# Patient Record
Sex: MALE | Race: WHITE | NOT HISPANIC OR LATINO | Employment: FULL TIME | URBAN - METROPOLITAN AREA
[De-identification: names, ages, dates, MRNs, and addresses within clinical notes are randomized per-mention and may not be internally consistent; named-entity substitution may affect disease eponyms.]

---

## 2019-03-06 ENCOUNTER — OFFICE VISIT (OUTPATIENT)
Dept: FAMILY MEDICINE | Facility: CLINIC | Age: 54
End: 2019-03-06
Payer: COMMERCIAL

## 2019-03-06 ENCOUNTER — TELEPHONE (OUTPATIENT)
Dept: FAMILY MEDICINE | Facility: CLINIC | Age: 54
End: 2019-03-06

## 2019-03-06 ENCOUNTER — HOSPITAL ENCOUNTER (OUTPATIENT)
Dept: RADIOLOGY | Facility: HOSPITAL | Age: 54
Discharge: HOME | End: 2019-03-06
Attending: INTERNAL MEDICINE
Payer: COMMERCIAL

## 2019-03-06 VITALS
TEMPERATURE: 99 F | WEIGHT: 181.69 LBS | SYSTOLIC BLOOD PRESSURE: 132 MMHG | OXYGEN SATURATION: 99 % | HEART RATE: 80 BPM | BODY MASS INDEX: 27.54 KG/M2 | HEIGHT: 68 IN | DIASTOLIC BLOOD PRESSURE: 80 MMHG

## 2019-03-06 DIAGNOSIS — M25.532 ACUTE PAIN OF LEFT WRIST: Primary | ICD-10-CM

## 2019-03-06 DIAGNOSIS — M25.532 ACUTE PAIN OF LEFT WRIST: ICD-10-CM

## 2019-03-06 PROCEDURE — 99203 OFFICE O/P NEW LOW 30 MIN: CPT | Mod: S$GLB,,, | Performed by: INTERNAL MEDICINE

## 2019-03-06 PROCEDURE — 73110 X-RAY EXAM OF WRIST: CPT | Mod: 26,LT,, | Performed by: RADIOLOGY

## 2019-03-06 PROCEDURE — 73110 X-RAY EXAM OF WRIST: CPT | Mod: TC,FY,PO,LT

## 2019-03-06 PROCEDURE — 73110 XR WRIST COMPLETE 3 VIEWS LEFT: ICD-10-PCS | Mod: 26,LT,, | Performed by: RADIOLOGY

## 2019-03-06 PROCEDURE — 99203 PR OFFICE/OUTPT VISIT, NEW, LEVL III, 30-44 MIN: ICD-10-PCS | Mod: S$GLB,,, | Performed by: INTERNAL MEDICINE

## 2019-03-06 PROCEDURE — 99999 PR PBB SHADOW E&M-EST. PATIENT-LVL III: ICD-10-PCS | Mod: PBBFAC,,, | Performed by: INTERNAL MEDICINE

## 2019-03-06 PROCEDURE — 99999 PR PBB SHADOW E&M-EST. PATIENT-LVL III: CPT | Mod: PBBFAC,,, | Performed by: INTERNAL MEDICINE

## 2019-03-06 PROCEDURE — 3008F PR BODY MASS INDEX (BMI) DOCUMENTED: ICD-10-PCS | Mod: CPTII,S$GLB,, | Performed by: INTERNAL MEDICINE

## 2019-03-06 PROCEDURE — 3008F BODY MASS INDEX DOCD: CPT | Mod: CPTII,S$GLB,, | Performed by: INTERNAL MEDICINE

## 2019-03-06 RX ORDER — NAPROXEN 500 MG/1
500 TABLET ORAL 2 TIMES DAILY
Qty: 28 TABLET | Refills: 0 | Status: SHIPPED | OUTPATIENT
Start: 2019-03-06 | End: 2019-03-20

## 2019-03-06 NOTE — PATIENT INSTRUCTIONS
Wrist Splint: Velcro  A splint is designed to prevent movement of the bones, muscles and tendons. Velcro wrist splints are used because of their comfort and convenience for wrist and hand injuries. In certain conditions, the splint can be removed when bathing or changing clothes. The condition you are being treated for will determine how long you should wear the splint and if it is safe to remove your splint before your next visit. If you are unsure, ask your nurse or doctor.  When to seek medical advice  Call your healthcare provider right away if any of these occur:  · Increased pain or swelling under the splint or in the hand or fingers  · Fingers or hand becomes cold, blue, numb or tingly  Date Last Reviewed: 11/21/2015 © 2000-2017 [employer]. [address] [place] [address], [place] [address]. All rights reserved. This information is not intended as a substitute for professional medical care. Always follow your healthcare professional's instructions.

## 2019-03-06 NOTE — TELEPHONE ENCOUNTER
Spoke with patient and informd him that per Dr. Ozuna his xray is negative, recommendation is to continued use of wrist brace, ice, and pain medication. Until symptoms resolves. Patient verbalized understanding.

## 2019-03-06 NOTE — LETTER
March 6, 2019      [facility]  [address] [facility]  [place] [address]  Phone: [phone]  Fax: [phone]       Patient: Jose M Pascual   YOB: 1965  Date of Visit: 03/06/2019    To Whom It May Concern:    Narinder Pascual  was at [facility] on 03/06/2019. He is excused from work until 3/12. If you have any questions or concerns, or if I can be of further assistance, please do not hesitate to contact me.    Sincerely,      Daryl Ozuna MD

## 2019-03-06 NOTE — TELEPHONE ENCOUNTER
Called patient to inform him of negative x-ray results - recommend continued use of wrist brace, ice, and pain medication/anti-inflammatory until symptoms resolving. LMOM to c/b regarding

## 2019-03-06 NOTE — TELEPHONE ENCOUNTER
----- Message from Gloria Hernandez sent at 3/6/2019  2:45 PM CST -----  Contact: alyse   Name of Who is Calling: alyse       What is the request in detail: Patient was returning a missed call back from the staff      Can the clinic reply by [facility]: no      What Number [facility] Call Back [facility]: [phone]

## 2019-03-06 NOTE — PROGRESS NOTES
Subjective:       Chief Complaint  Chief Complaint   Patient presents with    Fall    Wrist Pain       HPI  Jose M Pascual is a 53 y.o. male with multiple medical diagnoses as listed in the medical history and problem list that presents for fall/wrist pain.     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       PAST SURGICAL HISTORY:  Past Surgical History:   Procedure Laterality Date    KNEE ARTHROSCOPY Right 2010    KNEE SURGERY         SOCIAL HISTORY:  Social History     Socioeconomic History    Marital status:      Spouse name: Not on file    Number of children: Not on file    Years of education: Not on file    Highest education level: Not on file   Social Needs    Financial resource strain: Not on file    Food insecurity - worry: Not on file    Food insecurity - inability: Not on file    Transportation needs - medical: Not on file    Transportation needs - non-medical: Not on file   Occupational History    Not on file   Tobacco Use    Smoking status: Current Every Day Smoker     Types: Cigarettes    Smokeless tobacco: Current User     Types: Snuff    Tobacco comment: pt states not even a pack a day   Substance and Sexual Activity    Alcohol use: Yes     Alcohol/week: 0.0 oz     Frequency: Monthly or less     Drinks per session: 1 or 2    Drug use: No    Sexual activity: Not on file   Other Topics Concern    Not on file   Social History Narrative    Not on file       FAMILY HISTORY:  Family History   Problem Relation Age of Onset    Cancer Father         throat/       ALLERGIES AND MEDICATIONS: updated and reviewed.  Review of patient's allergies  "indicates:   Allergen Reactions    Mobic [meloxicam] Other (See Comments)     Pt is not sure of what reaction was, it has been awhile    Tramadol Other (See Comments)     Pt states it made him feel like he was having a heart attack     Current Outpatient Medications   Medication Sig Dispense Refill    fluticasone (FLONASE) 50 mcg/actuation nasal spray 1 spray by Each Nare route 2 (two) times daily as needed. 15 g 0    loratadine (CLARITIN) 10 mg tablet Take 1 tablet (10 mg total) by mouth once daily. 60 tablet 0    naproxen (NAPROSYN) 500 MG tablet Take 1 tablet (500 mg total) by mouth 2 (two) times daily. for 14 days 28 tablet 0     No current facility-administered medications for this visit.          ROS  Review of Systems   Constitutional: Negative.    Musculoskeletal: Positive for arthralgias (left wrist pain). Negative for gait problem.         Objective:       Physical Exam  Vitals:    03/06/19 1333   BP: 132/80   BP Location: Right arm   Patient Position: Sitting   BP Method: Medium (Manual)   Pulse: 80   Temp: 98.6 °F (37 °C)   TempSrc: Oral   SpO2: 99%   Weight: 82.4 kg (181 lb 10.5 oz)   Height: 5' 8" (1.727 m)    Body mass index is 27.62 kg/m².  Weight: 82.4 kg (181 lb 10.5 oz)   Height: 5' 8" (172.7 cm)   Physical Exam   Constitutional: He appears well-developed and well-nourished. No distress.   HENT:   Head: Normocephalic and atraumatic.   Eyes: Conjunctivae and EOM are normal. Pupils are equal, round, and reactive to light.   Cardiovascular: Normal rate.   Musculoskeletal: He exhibits edema and tenderness (left wrist/carpal bones). He exhibits no deformity.   Limited ROM of left wrist with flexion/extension   Neurological: He is alert. He displays normal reflexes. No cranial nerve deficit or sensory deficit. He exhibits normal muscle tone.   Skin: Skin is warm and dry. No rash noted.   Psychiatric: He has a normal mood and affect. His behavior is normal.           Assessment:     1. Acute pain of " left wrist      Plan:     Jose M was seen today for fall and wrist pain.    Diagnoses and all orders for this visit:    Acute pain of left wrist  Consider fracture vs tendinopathy in the setting of acute injury  Start NSAID and obtain plain films  Discussed use of wrist brace/immobilization  Consider Ortho referral pending imaging  -     naproxen (NAPROSYN) 500 MG tablet; Take 1 tablet (500 mg total) by mouth 2 (two) times daily. for 14 days  -     X-Ray Wrist Complete Left; Future          Health Maintenance       Date Due Completion Date    Hepatitis C Screening 1965 ---    TETANUS VACCINE 03/24/1982 ---    Colonoscopy 03/24/2014 ---    Lipid Panel 06/09/2015 6/9/2010    Influenza Vaccine 04/24/2019 (Originally 8/1/2018) ---    Pneumococcal Vaccine (Medium Risk) (1 of 1 - PPSV23) 03/06/2020 (Originally 3/24/1983) ---            Health Maintenance reviewed - discussed establishing care potentially with new PCP and addressing at subsequent visit    Follow-up if symptoms worsen or fail to improve.    The patient expressed understanding and no barriers to adherence were identified.     1. The patient indicates understanding of these issues and agrees with the plan. Brief care plan is updated and reviewed with the patient as applicable.     2. The patient is given an After Visit Summary that lists all medications with directions, allergies, orders placed during this encounter and follow-up instructions.     3. I have reviewed the patient's medical information including past medical, family, and social history sections including the medications and allergies.     4. We discussed the patient's current medications. I reconciled the patient's medication list and prepared and supplied needed refills.       Daryl Ozuna MD  Internal Medicine-Pediatrics

## 2019-03-12 ENCOUNTER — TELEPHONE (OUTPATIENT)
Dept: FAMILY MEDICINE | Facility: CLINIC | Age: 54
End: 2019-03-12

## 2019-03-12 DIAGNOSIS — M25.539 ACUTE WRIST PAIN, UNSPECIFIED LATERALITY: Primary | ICD-10-CM

## 2019-03-12 RX ORDER — DICLOFENAC SODIUM 50 MG/1
50 TABLET, DELAYED RELEASE ORAL 2 TIMES DAILY PRN
Qty: 30 TABLET | Refills: 0 | Status: SHIPPED | OUTPATIENT
Start: 2019-03-12

## 2019-03-12 NOTE — TELEPHONE ENCOUNTER
Spoke with patient and he states that the naproxen is causing him to have an upset stomach and it is not helping him with his pain. Patient would like something else prescribe for him. Please advise

## 2019-03-12 NOTE — TELEPHONE ENCOUNTER
Sent VOLTAREN to be taken twice daily to patient's preferred pharmacy on file. Continue usage of brace - if not improving with refer to Orthopedic Surgery

## 2019-03-12 NOTE — TELEPHONE ENCOUNTER
----- Message from Miriamtari Deepak sent at 3/12/2019 11:57 AM CDT -----  Contact: ANTONIO LEARY [[id number]]  Name of Who is Calling:ANTONIO LEARY [[id number]]        What is the request in detail: Pt requesting alternate medication for pain. States naproxen (NAPROSYN) 500 MG tablet helps with swelling but not pain. Contact at earliest convenience.  Thanks-          Can the clinic reply by [facility]: N    What Number to Call Back if not in [facility]: [phone]

## 2019-03-13 NOTE — TELEPHONE ENCOUNTER
Spoke with patient and informed him that a new prescription has been sent to the pharmacy. Patient verbalized understanding.

## 2019-03-14 ENCOUNTER — TELEPHONE (OUTPATIENT)
Dept: FAMILY MEDICINE | Facility: CLINIC | Age: 54
End: 2019-03-14

## 2019-03-14 NOTE — TELEPHONE ENCOUNTER
Would only use anti-inflammatory therapy otherwise recommend Orthopedic Surgery evaluation for further treatment discussion -

## 2019-03-14 NOTE — TELEPHONE ENCOUNTER
----- Message from Adelaide Vaz sent at 3/14/2019 10:15 AM CDT -----  Contact: Jose M [phone]  The patient is calling to notify the staff that the pharmacy has never received the new Rx that was sent over from Dr. Ozuna. He would like it resent to: [id number] - [place], [place] - [address]

## 2019-03-14 NOTE — TELEPHONE ENCOUNTER
Patient notified of information below and verbalized understanding.  He states that he will have to discuss seeing orthopedics with his wife before he agrees to go to for the consultation.

## 2020-06-25 ENCOUNTER — HOSPITAL ENCOUNTER (EMERGENCY)
Facility: HOSPITAL | Age: 55
Discharge: HOME | End: 2020-06-25
Attending: EMERGENCY MEDICINE
Payer: COMMERCIAL

## 2020-06-25 VITALS
RESPIRATION RATE: 20 BRPM | DIASTOLIC BLOOD PRESSURE: 72 MMHG | WEIGHT: 187 LBS | SYSTOLIC BLOOD PRESSURE: 165 MMHG | BODY MASS INDEX: 29.35 KG/M2 | HEART RATE: 56 BPM | OXYGEN SATURATION: 97 % | HEIGHT: 67 IN | TEMPERATURE: 98 F

## 2020-06-25 DIAGNOSIS — M51.36 BULGE OF LUMBAR DISC WITHOUT MYELOPATHY: ICD-10-CM

## 2020-06-25 DIAGNOSIS — R20.2 PARESTHESIAS: Primary | ICD-10-CM

## 2020-06-25 DIAGNOSIS — M54.16 LUMBAR RADICULOPATHY: ICD-10-CM

## 2020-06-25 LAB
ALBUMIN SERPL BCP-MCNC: 4.5 G/DL (ref 3.5–5.2)
ALP SERPL-CCNC: 87 U/L (ref 55–135)
ALT SERPL W/O P-5'-P-CCNC: 51 U/L (ref 10–44)
ANION GAP SERPL CALC-SCNC: 6 MMOL/L (ref 8–16)
AST SERPL-CCNC: 33 U/L (ref 10–40)
BASOPHILS # BLD AUTO: 0.05 K/UL (ref 0–0.2)
BASOPHILS NFR BLD: 0.7 % (ref 0–1.9)
BILIRUB SERPL-MCNC: 0.4 MG/DL (ref 0.1–1)
BUN SERPL-MCNC: 9 MG/DL (ref 6–20)
CALCIUM SERPL-MCNC: 9.4 MG/DL (ref 8.7–10.5)
CHLORIDE SERPL-SCNC: 106 MMOL/L (ref 95–110)
CO2 SERPL-SCNC: 30 MMOL/L (ref 23–29)
CREAT SERPL-MCNC: 1.2 MG/DL (ref 0.5–1.4)
DIFFERENTIAL METHOD: ABNORMAL
EOSINOPHIL # BLD AUTO: 0.3 K/UL (ref 0–0.5)
EOSINOPHIL NFR BLD: 3.7 % (ref 0–8)
ERYTHROCYTE [DISTWIDTH] IN BLOOD BY AUTOMATED COUNT: 14 % (ref 11.5–14.5)
EST. GFR  (AFRICAN AMERICAN): >60 ML/MIN/1.73 M^2
EST. GFR  (NON AFRICAN AMERICAN): >60 ML/MIN/1.73 M^2
GLUCOSE SERPL-MCNC: 80 MG/DL (ref 70–110)
HCT VFR BLD AUTO: 46.1 % (ref 40–54)
HGB BLD-MCNC: 15.3 G/DL (ref 14–18)
IMM GRANULOCYTES # BLD AUTO: 0.02 K/UL (ref 0–0.04)
IMM GRANULOCYTES NFR BLD AUTO: 0.3 % (ref 0–0.5)
LYMPHOCYTES # BLD AUTO: 2.3 K/UL (ref 1–4.8)
LYMPHOCYTES NFR BLD: 30.6 % (ref 18–48)
MCH RBC QN AUTO: 33 PG (ref 27–31)
MCHC RBC AUTO-ENTMCNC: 33.2 G/DL (ref 32–36)
MCV RBC AUTO: 100 FL (ref 82–98)
MONOCYTES # BLD AUTO: 0.6 K/UL (ref 0.3–1)
MONOCYTES NFR BLD: 7.6 % (ref 4–15)
NEUTROPHILS # BLD AUTO: 4.4 K/UL (ref 1.8–7.7)
NEUTROPHILS NFR BLD: 57.1 % (ref 38–73)
NRBC BLD-RTO: 0 /100 WBC
PLATELET # BLD AUTO: 258 K/UL (ref 150–350)
PMV BLD AUTO: 10.1 FL (ref 9.2–12.9)
POTASSIUM SERPL-SCNC: 4.4 MMOL/L (ref 3.5–5.1)
PROT SERPL-MCNC: 7.3 G/DL (ref 6–8.4)
RBC # BLD AUTO: 4.63 M/UL (ref 4.6–6.2)
SODIUM SERPL-SCNC: 142 MMOL/L (ref 136–145)
WBC # BLD AUTO: 7.64 K/UL (ref 3.9–12.7)

## 2020-06-25 PROCEDURE — 63600175 PHARM REV CODE 636 W HCPCS: Performed by: EMERGENCY MEDICINE

## 2020-06-25 PROCEDURE — 85025 COMPLETE CBC W/AUTO DIFF WBC: CPT

## 2020-06-25 PROCEDURE — 96372 THER/PROPH/DIAG INJ SC/IM: CPT

## 2020-06-25 PROCEDURE — 80053 COMPREHEN METABOLIC PANEL: CPT

## 2020-06-25 PROCEDURE — 99284 EMERGENCY DEPT VISIT MOD MDM: CPT | Mod: 25

## 2020-06-25 RX ORDER — METHYLPREDNISOLONE 4 MG/1
TABLET ORAL
Qty: 1 PACKAGE | Refills: 0 | Status: SHIPPED | OUTPATIENT
Start: 2020-06-25 | End: 2020-07-16

## 2020-06-25 RX ORDER — DIAZEPAM 5 MG/1
5 TABLET ORAL EVERY 8 HOURS PRN
Qty: 15 TABLET | Refills: 0 | Status: SHIPPED | OUTPATIENT
Start: 2020-06-25 | End: 2023-03-27

## 2020-06-25 RX ORDER — PREDNISONE 20 MG/1
20 TABLET ORAL
Status: COMPLETED | OUTPATIENT
Start: 2020-06-25 | End: 2020-06-25

## 2020-06-25 RX ORDER — KETOROLAC TROMETHAMINE 30 MG/ML
30 INJECTION, SOLUTION INTRAMUSCULAR; INTRAVENOUS
Status: COMPLETED | OUTPATIENT
Start: 2020-06-25 | End: 2020-06-25

## 2020-06-25 RX ADMIN — PREDNISONE 20 MG: 20 TABLET ORAL at 06:06

## 2020-06-25 RX ADMIN — KETOROLAC TROMETHAMINE 30 MG: 30 INJECTION, SOLUTION INTRAMUSCULAR at 06:06

## 2020-06-25 NOTE — ED NOTES
Pt reports intermittent numbness/tingling to damian legs and groin since Friday. Sates episodes last 5-10 minutes. Reports hx back pain and when he sits a certain way, the numbness comes. Lower back pain 4/10.

## 2020-06-26 ENCOUNTER — TELEPHONE (OUTPATIENT)
Dept: NEUROLOGY | Facility: CLINIC | Age: 55
End: 2020-06-26

## 2020-06-26 NOTE — ED PROVIDER NOTES
"Encounter Date: 6/25/2020       History     Chief Complaint   Patient presents with    Numbness     Patient reports intermittent numbness to BLE "for years". Reports recent numbness to genital area intermittently while sitting for too long. Denies weakness, blurred vision, weakness.      Mr Pascual reports years of intermittent tingling and pain in his L leg and in more recent years, also in the right leg. Reports pain begins in low back on either side and radiates into buttocks and down side of leg. Reports over past 6 days he's had new and more worrisome symptoms come and go. Reports pins and needles down both buttocks and sides of legs that wouldn't have made him come to er but it also involved his penis 3 episodes over past 4 days. Reports onset when sitting at rest, reports legs will 'go numb' and will feel numbness to penis with pain to tip of penis. No scrotal involvement. No weakness but a lot of pain and paresthesias. States he has to stand up and move position and symptoms get a little better but come back when sitting down. Had one episode of a lot of back pain 3d ago that resolved after 'a while'. No recent trauma. No other new issues. Has had tingling and intermittent pain in L arm for years, no worsening.  No change in bowel or bladder function.  No new difficulty initiating urination stream.  No problems with erection    The history is provided by the patient.     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/2012     Past Surgical History:   Procedure Laterality Date    KNEE ARTHROSCOPY Right 2010    KNEE SURGERY       Family History   Problem Relation Age of Onset    Cancer Father        "  throat/     Social History     Tobacco Use    Smoking status: Current Every Day Smoker     Types: Cigarettes    Smokeless tobacco: Current User     Types: Snuff    Tobacco comment: pt states not even a pack a day   Substance Use Topics    Alcohol use: Yes     Alcohol/week: 0.0 standard drinks     Frequency: Monthly or less     Drinks per session: 1 or 2    Drug use: No     Review of Systems   Gastrointestinal: Negative.  Negative for diarrhea and rectal pain.   Genitourinary: Negative for difficulty urinating, discharge, dysuria, enuresis and flank pain.   Musculoskeletal: Positive for back pain. Negative for gait problem.   Neurological:        Paresthesia as in HPI   All other systems reviewed and are negative.      Physical Exam     Initial Vitals [06/25/20 1219]   BP Pulse Resp Temp SpO2   (!) 145/86 73 18 98.1 °F (36.7 °C) 98 %      MAP       --         Physical Exam    Nursing note and vitals reviewed.  Constitutional: He appears well-developed and well-nourished. He is not diaphoretic. No distress.   Sitting up. Calm. Nad. Polite.    HENT:   Head: Normocephalic and atraumatic.   Eyes: Conjunctivae and EOM are normal.   Neck: Normal range of motion. Neck supple.   Pulmonary/Chest: Breath sounds normal. No respiratory distress. He has no wheezes. He has no rales.   Abdominal: Soft. He exhibits no distension. There is no abdominal tenderness.   Musculoskeletal: Normal range of motion. No tenderness or edema.   Neurological: He is alert and oriented to person, place, and time. He has normal strength. GCS score is 15. GCS eye subscore is 4. GCS verbal subscore is 5. GCS motor subscore is 6.   No clonus. 5/5 strength in all ext. 2+ L patellar reflex. 1+ on R. Normal gait. Neg romberg. Positive straight leg raise with pain in back at 40deg   Skin: Skin is warm. Capillary refill takes less than 2 seconds.   Psychiatric: He has a normal mood and affect. Thought content normal.         ED Course    Procedures  Labs Reviewed   CBC W/ AUTO DIFFERENTIAL - Abnormal; Notable for the following components:       Result Value    Mean Corpuscular Volume 100 (*)     Mean Corpuscular Hemoglobin 33.0 (*)     All other components within normal limits   COMPREHENSIVE METABOLIC PANEL - Abnormal; Notable for the following components:    CO2 30 (*)     ALT 51 (*)     Anion Gap 6 (*)     All other components within normal limits          Imaging Results          MRI Thoracic Spine Without Contrast (Final result)  Result time 06/25/20 18:18:52    Final result by Dony Wheeler MD (06/25/20 18:18:52)                 Impression:      Unremarkable MRI of the thoracic spine.      Electronically signed by: Dony Wheeler MD  Date:    06/25/2020  Time:    18:18             Narrative:    EXAMINATION:  MRI THORACIC SPINE WITHOUT CONTRAST    CLINICAL HISTORY:  T-spine canal stenosis;    TECHNIQUE:  Multiplanar, multisequence images were performed through the thoracic spine.  Contrast was not administered.    COMPARISON:  Lumbar spine MRI dated 06/25/2020.    FINDINGS:  The thoracic alignment is within normal limits.  The vertebral body heights are maintained.  The bone marrow signal is within normal limits.    The cord is normal in signal and caliber.  There are no intraspinal collections.  There is no evidence of mass effect on the cord.    The intervertebral disc spaces are maintained.  There is mild hypertrophy of the posterior elements.  Evaluation of the individual disc levels reveals no evidence of spinal canal or neural foraminal narrowing.    The paraspinal soft tissues are within normal limits.  There is a T2 hyperintense linear artifact at the cervicothoracic junction.                               MRI Lumbar Spine Without Contrast (Final result)  Result time 06/25/20 15:51:54    Final result by Kai Clark MD (06/25/20 15:51:54)                 Impression:      Modest lumbar spondylosis without significant central canal  stenosis or neural foraminal impingement.      Electronically signed by: Kai Clark  Date:    06/25/2020  Time:    15:51             Narrative:    EXAMINATION:  MRI LUMBAR SPINE WITHOUT CONTRAST    CLINICAL HISTORY:  Back pain, cauda equina syndrome suspected;    TECHNIQUE:  Multiplanar, multisequence MR images were acquired from the thoracolumbar junction to the sacrum without the administration of contrast.    COMPARISON:  CT abdomen pelvis with contrast dated 12/01/2016    FINDINGS:  Lumbar spine vertebral body heights and alignment appear maintained.  No infiltrative marrow process.  Spinal cord terminus lies at L1-L2.  Visualized prevertebral and paraspinal soft tissues demonstrate no significant abnormality.    T12-L1: No significant posterior disc herniation, spinal canal stenosis, or neural foraminal impingement.    L1-L2: No significant posterior disc herniation, spinal canal stenosis, or neural foraminal impingement.    L2-L3: Mild circumferential bulge without significant central canal stenosis.  Mild right neural foraminal narrowing.    L3-L4: Minimal bulge without significant central canal stenosis or neural foraminal impingement.    L4-L5: Minimal bulge without significant central canal stenosis or neural foraminal impingement.    L5-S1: No significant posterior disc herniation, spinal canal stenosis, or neural foraminal impingement.                                 Medical Decision Making:   Clinical Tests:   Lab Tests: Ordered and Reviewed  Radiological Study: Ordered and Reviewed  ED Management:  Mr Pascual has been stable during his time in the ER.  He has been sitting in a chair.  He reports he is hungry and wants to go home.  He does report some sciatica, lumbar radiculopathy type pain to the left side, not on right.  He remains with a normal steady gait.  MRI lumbar spine obtained.  Discussed clinical picture with neurosurgery, who recommended MRI T-spine.  MRI T-spine performed and both are  acutely unremarkable.  Discussed with neurosurgery.  Patient can follow up in clinic soon.  Discussed radiology results with patient.  Will place on Medrol Dosepak, provide muscle relaxants.  Discussed home care and return precautions.  He voiced good understanding.  Will follow up with your surgery.  Is stable for discharge.  There is no indication for further emergent intervention or evaluation this time.                                 Clinical Impression:       ICD-10-CM ICD-9-CM   1. Paresthesias  R20.2 782.0   2. Lumbar radiculopathy  M54.16 724.4   3. Bulge of lumbar disc without myelopathy  M51.26 722.10             ED Disposition Condition    Discharge Stable        ED Prescriptions     Medication Sig Dispense Start Date End Date Auth. Provider    methylPREDNISolone (MEDROL DOSEPACK) 4 mg tablet Take dosepak as directed 1 Package 6/25/2020 7/16/2020 Cynthia Grider MD    diazePAM (VALIUM) 5 MG tablet Take 1 tablet (5 mg total) by mouth every 8 (eight) hours as needed (muscle spasms). 15 tablet 6/25/2020 7/25/2020 Cynthia Grider MD        Follow-up Information     Follow up With Specialties Details Why Contact Info    Princess Blanchard MD Neurosurgery Schedule an appointment as soon as possible for a visit   [address] [facility] [address]  [phone]                                       Cynthia Grider MD  06/29/20 3220

## 2021-04-15 ENCOUNTER — PATIENT MESSAGE (OUTPATIENT)
Dept: RESEARCH | Facility: HOSPITAL | Age: 56
End: 2021-04-15

## 2023-03-27 ENCOUNTER — OFFICE VISIT (OUTPATIENT)
Dept: CARDIOLOGY | Facility: CLINIC | Age: 58
End: 2023-03-27
Payer: COMMERCIAL

## 2023-03-27 VITALS
HEIGHT: 67 IN | DIASTOLIC BLOOD PRESSURE: 76 MMHG | SYSTOLIC BLOOD PRESSURE: 132 MMHG | OXYGEN SATURATION: 97 % | WEIGHT: 179 LBS | HEART RATE: 76 BPM | BODY MASS INDEX: 28.09 KG/M2 | RESPIRATION RATE: 18 BRPM

## 2023-03-27 DIAGNOSIS — R07.9 CHEST PAIN, UNSPECIFIED TYPE: ICD-10-CM

## 2023-03-27 DIAGNOSIS — I10 HYPERTENSION, UNSPECIFIED TYPE: Primary | ICD-10-CM

## 2023-03-27 PROCEDURE — 3008F BODY MASS INDEX DOCD: CPT | Mod: CPTII,S$GLB,, | Performed by: INTERNAL MEDICINE

## 2023-03-27 PROCEDURE — 99999 PR PBB SHADOW E&M-EST. PATIENT-LVL IV: ICD-10-PCS | Mod: PBBFAC,,, | Performed by: INTERNAL MEDICINE

## 2023-03-27 PROCEDURE — 1160F RVW MEDS BY RX/DR IN RCRD: CPT | Mod: CPTII,S$GLB,, | Performed by: INTERNAL MEDICINE

## 2023-03-27 PROCEDURE — 93000 EKG 12-LEAD: ICD-10-PCS | Mod: S$GLB,,, | Performed by: INTERNAL MEDICINE

## 2023-03-27 PROCEDURE — 1159F MED LIST DOCD IN RCRD: CPT | Mod: CPTII,S$GLB,, | Performed by: INTERNAL MEDICINE

## 2023-03-27 PROCEDURE — 1159F PR MEDICATION LIST DOCUMENTED IN MEDICAL RECORD: ICD-10-PCS | Mod: CPTII,S$GLB,, | Performed by: INTERNAL MEDICINE

## 2023-03-27 PROCEDURE — 3078F DIAST BP <80 MM HG: CPT | Mod: CPTII,S$GLB,, | Performed by: INTERNAL MEDICINE

## 2023-03-27 PROCEDURE — 99204 PR OFFICE/OUTPT VISIT, NEW, LEVL IV, 45-59 MIN: ICD-10-PCS | Mod: S$GLB,,, | Performed by: INTERNAL MEDICINE

## 2023-03-27 PROCEDURE — 3075F SYST BP GE 130 - 139MM HG: CPT | Mod: CPTII,S$GLB,, | Performed by: INTERNAL MEDICINE

## 2023-03-27 PROCEDURE — 1160F PR REVIEW ALL MEDS BY PRESCRIBER/CLIN PHARMACIST DOCUMENTED: ICD-10-PCS | Mod: CPTII,S$GLB,, | Performed by: INTERNAL MEDICINE

## 2023-03-27 PROCEDURE — 3078F PR MOST RECENT DIASTOLIC BLOOD PRESSURE < 80 MM HG: ICD-10-PCS | Mod: CPTII,S$GLB,, | Performed by: INTERNAL MEDICINE

## 2023-03-27 PROCEDURE — 3075F PR MOST RECENT SYSTOLIC BLOOD PRESS GE 130-139MM HG: ICD-10-PCS | Mod: CPTII,S$GLB,, | Performed by: INTERNAL MEDICINE

## 2023-03-27 PROCEDURE — 93000 ELECTROCARDIOGRAM COMPLETE: CPT | Mod: S$GLB,,, | Performed by: INTERNAL MEDICINE

## 2023-03-27 PROCEDURE — 3008F PR BODY MASS INDEX (BMI) DOCUMENTED: ICD-10-PCS | Mod: CPTII,S$GLB,, | Performed by: INTERNAL MEDICINE

## 2023-03-27 PROCEDURE — 99204 OFFICE O/P NEW MOD 45 MIN: CPT | Mod: S$GLB,,, | Performed by: INTERNAL MEDICINE

## 2023-03-27 PROCEDURE — 99999 PR PBB SHADOW E&M-EST. PATIENT-LVL IV: CPT | Mod: PBBFAC,,, | Performed by: INTERNAL MEDICINE

## 2023-03-27 NOTE — PROGRESS NOTES
CARDIOVASCULAR CONSULTATION    REASON FOR CONSULT:   Jose M Pascual is a 58 y.o. male who presents for ***.      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 If you have any questions, ask your nurse or doctor.                CONTINUE taking these medications      diazePAM 5 MG tablet  Commonly known as: VALIUM  Take 1 tablet (5 mg total) by mouth every 8 (eight) hours as needed (muscle spasms).     diclofenac 50 MG EC tablet  Commonly known as: VOLTAREN  Take 1 tablet (50 mg total) by mouth 2 (two) times daily as needed (wrist pain).     fluticasone propionate 50 mcg/actuation nasal spray  Commonly known as: FLONASE  1 spray by Each Nare route 2 (two) times daily as needed.     loratadine 10 mg tablet  Commonly known as: CLARITIN  Take 1 tablet (10 mg total) by mouth once daily.              SOCIAL HISTORY:     Social History     Socioeconomic History    Marital status:    Tobacco Use    Smoking status: Every Day     Types: Cigarettes    Smokeless tobacco: Current     Types: Snuff    Tobacco comments:     pt states not even a pack a day   Substance and Sexual Activity    Alcohol use: Yes     Alcohol/week: 0.0 standard drinks    Drug use: No       FAMILY HISTORY:     Family History   Problem Relation Age of Onset    Cancer Father         throat/       REVIEW OF SYSTEMS:   ROS    A 10 point review  "of systems was performed and all the pertinent positives have been mentioned. Rest of review of systems was negative.        PHYSICAL EXAM:     Vitals:    03/27/23 1046   BP: 132/76   Pulse: 76   Resp: 18    Body mass index is 28.04 kg/m².  Weight: 81.2 kg (179 lb 0.2 oz)   Height: 5' 7" (170.2 cm)     Physical Exam  Cardiovascular:      Heart sounds: Murmur heard.         DATA:     Laboratory:  CBC:  Recent Labs   Lab 06/25/20  1359   WBC 7.64   Hemoglobin 15.3   Hematocrit 46.1   Platelets 258       CHEMISTRIES:  Recent Labs   Lab 06/25/20  1359   Glucose 80   Sodium 142   Potassium 4.4   BUN 9   Creatinine 1.2   eGFR if  >60   eGFR if non African American >60   Calcium 9.4       CARDIAC BIOMARKERS:        COAGS:        LIPIDS/LFTS:  Recent Labs   Lab 06/25/20  1359   AST 33   ALT 51 H       Hemoglobin A1C   Date Value Ref Range Status   03/02/2011 5.6 4.0 - 6.2 % Final       TSH        The ASCVD Risk score (Adalberto DK, et al., 2019) failed to calculate for the following reasons:    Cannot find a previous HDL lab    Cannot find a previous total cholesterol lab             ASSESSMENT AND PLAN     Patient Active Problem List   Diagnosis    Knee pain    Traumatic osteoarthritis of knee or lower leg    Iliotibial band syndrome                 Thank you very much for involving me in the care of your patient.  Please do not hesitate to contact me if there are any questions.      Milton Soni MD, FACC, [facility]  Interventional Cardiologist, [facility].           This note was dictated with the help of speech recognition software.  There might be un-intended errors and/or substitutions.                "

## 2023-03-27 NOTE — PROGRESS NOTES
CARDIOVASCULAR CONSULTATION    REASON FOR CONSULT:   Jose M Pascual is a 58 y.o. male who presents for evaluation of chest pain    HISTORY OF PRESENT ILLNESS:       Patient is a pleasant 58-year-old man.  Here for evaluation of chest pain.  No particular aggravating or relieving factors.  Denies any orthopnea, PND.  Smokes about half a pack a day.      PAST MEDICAL HISTORY:     Past Medical History:   Diagnosis Date    Traumatic osteoarthritis of knee or lower leg 8/1/2012       PAST SURGICAL HISTORY:     Past Surgical History:   Procedure Laterality Date    KNEE ARTHROSCOPY Right 2010    KNEE SURGERY         ALLERGIES AND MEDICATION:     Review of patient's allergies indicates:   Allergen Reactions    Mobic [meloxicam] Other (See Comments)     Pt is not sure of what reaction was, it has been awhile    Tramadol Other (See Comments)     Pt states it made him feel like he was having a heart attack        Medication List            Accurate as of March 27, 2023 11:14 AM. If you have any questions, ask your nurse or doctor.                CONTINUE taking these medications      diazePAM 5 MG tablet  Commonly known as: VALIUM  Take 1 tablet (5 mg total) by mouth every 8 (eight) hours as needed (muscle spasms).     diclofenac 50 MG EC tablet  Commonly known as: VOLTAREN  Take 1 tablet (50 mg total) by mouth 2 (two) times daily as needed (wrist pain).     fluticasone propionate 50 mcg/actuation nasal spray  Commonly known as: FLONASE  1 spray by Each Nare route 2 (two) times daily as needed.     loratadine 10 mg tablet  Commonly known as: CLARITIN  Take 1 tablet (10 mg total) by mouth once daily.              SOCIAL HISTORY:     Social History     Socioeconomic History    Marital status:    Tobacco Use    Smoking status: Every Day     Types: Cigarettes    Smokeless tobacco: Current     Types: Snuff    Tobacco comments:     pt states not even a pack a day   Substance and Sexual Activity    Alcohol use: Yes      "Alcohol/week: 0.0 standard drinks    Drug use: No       FAMILY HISTORY:     Family History   Problem Relation Age of Onset    Cancer Father         throat/       REVIEW OF SYSTEMS:   Review of Systems   Constitutional: Negative.   HENT: Negative.     Eyes: Negative.    Cardiovascular:  Positive for chest pain.   Respiratory: Negative.     Endocrine: Negative.    Hematologic/Lymphatic: Negative.    Skin: Negative.    Musculoskeletal: Negative.    Gastrointestinal: Negative.    Genitourinary: Negative.    Neurological: Negative.    Psychiatric/Behavioral: Negative.     Allergic/Immunologic: Negative.      A 10 point review of systems was performed and all the pertinent positives have been mentioned. Rest of review of systems was negative.        PHYSICAL EXAM:     Vitals:    03/27/23 1046   BP: 132/76   Pulse: 76   Resp: 18    Body mass index is 28.04 kg/m².  Weight: 81.2 kg (179 lb 0.2 oz)   Height: 5' 7" (170.2 cm)     Physical Exam  Vitals reviewed.   Constitutional:       Appearance: He is well-developed.   HENT:      Head: Normocephalic.   Eyes:      Conjunctiva/sclera: Conjunctivae normal.      Pupils: Pupils are equal, round, and reactive to light.   Cardiovascular:      Rate and Rhythm: Normal rate and regular rhythm.      Heart sounds: Murmur heard.   Pulmonary:      Effort: Pulmonary effort is normal.      Breath sounds: Normal breath sounds.   Abdominal:      General: Bowel sounds are normal.      Palpations: Abdomen is soft.   Musculoskeletal:      Cervical back: Normal range of motion and neck supple.   Skin:     General: Skin is warm.   Neurological:      Mental Status: He is alert and oriented to person, place, and time.         DATA:     Laboratory:  CBC:  Recent Labs   Lab 06/25/20  1359   WBC 7.64   Hemoglobin 15.3   Hematocrit 46.1   Platelets 258       CHEMISTRIES:  Recent Labs   Lab 06/25/20  1359   Glucose 80   Sodium 142   Potassium 4.4   BUN 9   Creatinine 1.2   eGFR if African American >60 "   eGFR if non African American >60   Calcium 9.4       CARDIAC BIOMARKERS:        COAGS:        LIPIDS/LFTS:  Recent Labs   Lab 06/25/20  1359   AST 33   ALT 51 H       Hemoglobin A1C   Date Value Ref Range Status   03/02/2011 5.6 4.0 - 6.2 % Final       TSH        The ASCVD Risk score (Adalberto CERVANTES, et al., 2019) failed to calculate for the following reasons:    Cannot find a previous HDL lab    Cannot find a previous total cholesterol lab             ASSESSMENT AND PLAN     Patient Active Problem List   Diagnosis    Knee pain    Traumatic osteoarthritis of knee or lower leg    Iliotibial band syndrome         Patient with multiple risk factors for coronary artery disease.  Coming with chest pain.  States will not be able to run on a treadmill because of knee pain and back pain.  Further evaluation with the help of pharmacological nuclear stress testing and echocardiogram.  Follow-up after testing        Thank you very much for involving me in the care of your patient.  Please do not hesitate to contact me if there are any questions.      Milton Soni MD, FACC, [facility]  Interventional Cardiologist, [facility].           This note was dictated with the help of speech recognition software.  There might be un-intended errors and/or substitutions.

## 2023-04-12 ENCOUNTER — TELEPHONE (OUTPATIENT)
Dept: CARDIOLOGY | Facility: CLINIC | Age: 58
End: 2023-04-12
Payer: COMMERCIAL

## 2023-04-12 NOTE — TELEPHONE ENCOUNTER
I have attempted to contact this patient by phone with the following results: no answer.          ----- Message from Milton Soni MD sent at 4/10/2023  1:26 PM CDT -----  Which stress test would she prefer and why?      ----- Message -----  From: Kacy Paredes RN  Sent: 3/30/2023   5:04 PM CDT  To: Milton Soni MD      ----- Message -----  From: Elder Elliott  Sent: 3/30/2023  12:44 PM CDT  To: [facility] Staff    Type: Patient Call Back    Who called:pt     What is the request in detail:pt wife requesting a different stress test be done     Can the clinic reply by [facility]?call     Would the patient rather a call back or a response via [facility]?call back     Best call back number: [phone] - pt   [phone]- wife       Additional Information:

## 2023-10-05 ENCOUNTER — OFFICE VISIT (OUTPATIENT)
Dept: URGENT CARE | Facility: CLINIC | Age: 58
End: 2023-10-05
Payer: COMMERCIAL

## 2023-10-05 VITALS
TEMPERATURE: 98 F | DIASTOLIC BLOOD PRESSURE: 87 MMHG | HEART RATE: 62 BPM | HEIGHT: 67 IN | SYSTOLIC BLOOD PRESSURE: 147 MMHG | RESPIRATION RATE: 17 BRPM | BODY MASS INDEX: 28.09 KG/M2 | OXYGEN SATURATION: 96 % | WEIGHT: 179 LBS

## 2023-10-05 DIAGNOSIS — L03.221 CELLULITIS OF NECK: Primary | ICD-10-CM

## 2023-10-05 PROBLEM — M48.02 SPINAL STENOSIS IN CERVICAL REGION: Status: ACTIVE | Noted: 2021-04-22

## 2023-10-05 PROBLEM — M48.061 SPINAL STENOSIS OF LUMBAR REGION: Status: ACTIVE | Noted: 2020-08-13

## 2023-10-05 PROBLEM — M50.20 DISPLACEMENT OF CERVICAL INTERVERTEBRAL DISC WITHOUT MYELOPATHY: Status: ACTIVE | Noted: 2021-04-22

## 2023-10-05 PROBLEM — G89.4 CHRONIC PAIN SYNDROME: Status: ACTIVE | Noted: 2022-03-18

## 2023-10-05 PROCEDURE — 99203 OFFICE O/P NEW LOW 30 MIN: CPT | Mod: S$GLB,,,

## 2023-10-05 PROCEDURE — 99203 PR OFFICE/OUTPT VISIT, NEW, LEVL III, 30-44 MIN: ICD-10-PCS | Mod: S$GLB,,,

## 2023-10-05 RX ORDER — CELECOXIB 200 MG/1
200 CAPSULE ORAL
COMMUNITY
Start: 2023-09-13

## 2023-10-05 RX ORDER — HYDROCODONE BITARTRATE AND ACETAMINOPHEN 7.5; 325 MG/1; MG/1
1 TABLET ORAL EVERY 6 HOURS PRN
COMMUNITY
Start: 2023-09-17

## 2023-10-05 RX ORDER — MUPIROCIN 20 MG/G
OINTMENT TOPICAL 3 TIMES DAILY
Qty: 22 G | Refills: 0 | Status: SHIPPED | OUTPATIENT
Start: 2023-10-05

## 2023-10-05 RX ORDER — CEPHALEXIN 500 MG/1
500 CAPSULE ORAL EVERY 6 HOURS
Qty: 20 CAPSULE | Refills: 0 | Status: SHIPPED | OUTPATIENT
Start: 2023-10-05 | End: 2023-10-10

## 2023-10-05 NOTE — PATIENT INSTRUCTIONS
Please return here or go to the Emergency Department for any concerns or worsening of condition.    Please take CEPHALEXIN for cellulitis. Please complete the full course of this antibiotics. Please take this antibiotic with food and a full glass of water.    Please apply MUPIROCIN to the affected area.    Please follow up with your primary care doctor or specialist as needed.    If you  smoke, please stop smoking.

## 2023-10-05 NOTE — PROGRESS NOTES
"Subjective:      Patient ID: Jose M Pascual is a 58 y.o. male.    Vitals:  height is 5' 7" (1.702 m) and weight is 81.2 kg (179 lb). His oral temperature is 98 °F (36.7 °C). His blood pressure is 147/87 (abnormal) and his pulse is 62. His respiration is 17 and oxygen saturation is 96%.     Chief Complaint: Neck Pain    Patient presents with what he believes is a spider bite.  He states that 3 days ago he noticed a "knot on his neck" and now he has noticed increased redness and swelling.  Associated symptoms include a mild headache. He denies fever, chills, numbness, tingling, neck stiffness, lightheadedness, dizziness, vision changes.  Patient states that he has not tried anything for his symptoms.    Neck Pain   This is a new problem. The current episode started in the past 7 days. The problem occurs constantly. The problem has been unchanged. The pain is associated with nothing. The pain is present in the right side. The quality of the pain is described as aching (sore). The pain is at a severity of 0/10 ("3/10 when I touch it"). The pain is Same all the time. Pertinent negatives include no chest pain, fever, headaches, leg pain, numbness, pain with swallowing, paresis, photophobia, syncope, tingling, trouble swallowing, visual change, weakness or weight loss. He has tried nothing for the symptoms. The treatment provided no relief.       Constitution: Negative for chills and fever.   HENT:  Negative for trouble swallowing.    Neck: Positive for neck pain. Negative for neck stiffness.   Cardiovascular:  Negative for chest pain and passing out.   Eyes:  Negative for photophobia.   Respiratory:  Negative for shortness of breath.    Gastrointestinal:  Negative for abdominal pain, nausea, vomiting and diarrhea.   Skin:  Positive for lesion and erythema.   Neurological:  Negative for dizziness, light-headedness, headaches, numbness and tingling.      Objective:     Physical Exam   Constitutional:  Non-toxic " appearance. He does not appear ill. No distress.      Comments:Patient is in no acute distress, patient is non-toxic appearing, patient is ox3, patient is answering question appropriately.   normal  Eyes: Conjunctivae are normal. Pupils are equal, round, and reactive to light. Right eye exhibits no discharge. Left eye exhibits no discharge. No scleral icterus. Extraocular movement intact   Neck: Carotid bruit is not present. No crepitus. There are no signs of injury. No Brudzinski's sign and no Kernig's sign noted. No torticollis present.          Comments: No swelling appreciated on exam. Neck is symmetrical and without rigidity. Red represents area of involvement. There is erythema and warmth appreciated on exam. There is no area of fluctuance, no area of induration, no discharge on exam. No edema present. erythema present.No neck rigidity present. No decreased range of motion present. No pain with movement present. No spinous process tenderness present. No muscular tenderness present.   Cardiovascular:   Pulses:       Carotid pulses are 2+ on the right side and 2+ on the left side.       Radial pulses are 2+ on the right side and 2+ on the left side.   Abdominal: Normal appearance.   Musculoskeletal:      Comments: Patient displayed great  strength. Sensation intact.   Lymphadenopathy:     He has no cervical adenopathy.   Neurological: He is alert.   Skin: Skin is not diaphoretic. erythema   Nursing note and vitals reviewed.    Assessment:     1. Cellulitis of neck      Plan:   Previous notes reviewed.  Vital signs reviewed.  Discussed cellulitis of neck, home care, tx options, and given follow up precautions.  Patient was briefed on my thought process and diagnosis.   Patient involved with the treatment plan and agreed to the plan.  Patient informed on warning signs, patient understood warning signs and to go to urgent care or ER if warning signs appear.  Please excuse grammatical/spelling errors appreciated  throughout this visit encounter for a remote dictation device was used during this encounter.    Patient Instructions   Please return here or go to the Emergency Department for any concerns or worsening of condition.    Please take CEPHALEXIN for cellulitis. Please complete the full course of this antibiotics. Please take this antibiotic with food and a full glass of water.    Please apply MUPIROCIN to the affected area.    Please follow up with your primary care doctor or specialist as needed.    If you  smoke, please stop smoking.    Cellulitis of neck  -     cephALEXin (KEFLEX) 500 MG capsule; Take 1 capsule (500 mg total) by mouth every 6 (six) hours. for 5 days  Dispense: 20 capsule; Refill: 0  -     mupirocin (BACTROBAN) 2 % ointment; Apply topically 3 (three) times daily.  Dispense: 22 g; Refill: 0      Ellie Hyde PA-C

## 2024-07-22 ENCOUNTER — OFFICE VISIT (OUTPATIENT)
Dept: URGENT CARE | Facility: CLINIC | Age: 59
End: 2024-07-22
Payer: COMMERCIAL

## 2024-07-22 VITALS
BODY MASS INDEX: 25.96 KG/M2 | DIASTOLIC BLOOD PRESSURE: 79 MMHG | RESPIRATION RATE: 16 BRPM | OXYGEN SATURATION: 95 % | SYSTOLIC BLOOD PRESSURE: 111 MMHG | HEART RATE: 73 BPM | TEMPERATURE: 98 F | HEIGHT: 67 IN | WEIGHT: 165.38 LBS

## 2024-07-22 DIAGNOSIS — R09.82 POST-NASAL DRIP: ICD-10-CM

## 2024-07-22 DIAGNOSIS — R05.9 COUGH, UNSPECIFIED TYPE: Primary | ICD-10-CM

## 2024-07-22 PROCEDURE — 99213 OFFICE O/P EST LOW 20 MIN: CPT | Mod: S$GLB,,,

## 2024-07-22 RX ORDER — PROMETHAZINE HYDROCHLORIDE AND DEXTROMETHORPHAN HYDROBROMIDE 6.25; 15 MG/5ML; MG/5ML
5 SYRUP ORAL EVERY 4 HOURS PRN
Qty: 118 ML | Refills: 0 | Status: SHIPPED | OUTPATIENT
Start: 2024-07-22 | End: 2024-07-29

## 2024-07-22 NOTE — PATIENT INSTRUCTIONS
Your illness is likely caused by a virus and antibiotics would not be beneficial at this time.    Please drink plenty of fluids and get plenty of rest.    Take over the counter Sudafed tp help with congestion and post-nasal drip symptoms.     May gargle salt water for sore throat, a tablespoon of honey is helpful for cough, especially at night.     If not allergic, please take over the counter Tylenol (Acetaminophen) and/or Motrin (Ibuprofen) as directed for control of pain and/or fever.    Please follow up with your primary care doctor or specialist as needed.    If you  smoke, please stop smoking.    - You must understand that you have received an Urgent Care treatment only and that you may be released before all of your medical problems are known or treated.   - You, the patient, will arrange for follow up care as instructed.   - If your condition worsens or fails to improve we recommend that you receive another evaluation at the ER immediately or contact your PCP to discuss your concerns or return here.   - Follow up with your PCP or specialty clinic as directed in the next 1-2 weeks if not improved or as needed.  You can call [phone] to schedule an appointment with the appropriate provider.      If your symptoms do not improve or worsen, go to the emergency room immediately.

## 2024-07-22 NOTE — LETTER
July 22, 2024      [facility]  [address], [address]  [place] [address]  Phone: [phone]  Fax: [phone]       Patient: Jose M Pascual   YOB: 1965  Date of Visit: 07/22/2024    To Whom It May Concern:    Narinder Pascual  was at [facility] on 07/22/2024. The patient may return to work on 7/23/24 with no restrictions. If you have any questions or concerns, or if I can be of further assistance, please do not hesitate to contact me.    Sincerely,          Shirley Barry NP

## 2024-07-22 NOTE — PROGRESS NOTES
"Subjective:      Patient ID: Jose M Pascual is a 59 y.o. male.    Vitals:  height is 5' 7" (1.702 m) and weight is 75 kg (165 lb 5.5 oz). His temperature is 98.2 °F (36.8 °C). His blood pressure is 111/79 and his pulse is 73. His respiration is 16 and oxygen saturation is 95%.     Chief Complaint: Cough    Pt here for cough and congestion onset last week (pt unsure exactly how long ago). Denies any CP, SOB, fever, congestion or other associated symptoms.  Patient states wife has similar symptoms.  Patient is active smoker.     Cough  This is a new problem. The current episode started in the past 7 days. The problem has been gradually worsening. The problem occurs constantly. The cough is Productive of sputum. Pertinent negatives include no chest pain, chills, ear congestion, ear pain, fever, headaches, heartburn, hemoptysis, myalgias, nasal congestion, postnasal drip, rash, rhinorrhea, sore throat, shortness of breath, sweats, weight loss or wheezing. Nothing aggravates the symptoms. Risk factors for lung disease include smoking/tobacco exposure. He has tried OTC cough suppressant for the symptoms. His past medical history is significant for bronchitis. There is no history of asthma, bronchiectasis, COPD, emphysema, environmental allergies or pneumonia.       Constitution: Negative for chills and fever.   HENT:  Negative for ear pain, congestion, postnasal drip and sore throat.    Cardiovascular:  Negative for chest pain.   Respiratory:  Positive for cough. Negative for bloody sputum, shortness of breath and wheezing.    Gastrointestinal:  Negative for heartburn.   Musculoskeletal:  Negative for muscle ache.   Skin:  Negative for rash.   Allergic/Immunologic: Negative for environmental allergies.   Neurological:  Negative for headaches.      Objective:     Physical Exam   Constitutional: He is oriented to person, place, and time. He appears well-developed. He is cooperative.  Non-toxic appearance. He does not " appear ill. No distress.   HENT:   Head: Normocephalic and atraumatic.   Ears:   Right Ear: Hearing, tympanic membrane, external ear and ear canal normal.   Left Ear: Hearing, tympanic membrane, external ear and ear canal normal.   Nose: Nose normal. No mucosal edema, rhinorrhea or nasal deformity. No epistaxis. Right sinus exhibits no maxillary sinus tenderness and no frontal sinus tenderness. Left sinus exhibits no maxillary sinus tenderness and no frontal sinus tenderness.   Mouth/Throat: Uvula is midline, oropharynx is clear and moist and mucous membranes are normal. No trismus in the jaw. Normal dentition. No uvula swelling. No oropharyngeal exudate, posterior oropharyngeal edema or posterior oropharyngeal erythema.   Eyes: Conjunctivae and lids are normal. No scleral icterus.   Neck: Trachea normal and phonation normal. Neck supple. No edema present. No erythema present. No neck rigidity present.   Cardiovascular: Normal rate, regular rhythm, normal heart sounds and normal pulses.   Pulmonary/Chest: Effort normal and breath sounds normal. No respiratory distress. He has no decreased breath sounds. He has no rhonchi.   Abdominal: Normal appearance.   Musculoskeletal: Normal range of motion.         General: No deformity. Normal range of motion.   Neurological: He is alert and oriented to person, place, and time. He exhibits normal muscle tone. Coordination normal.   Skin: Skin is warm, dry, intact, not diaphoretic and not pale.   Psychiatric: His speech is normal and behavior is normal. Judgment and thought content normal.   Nursing note and vitals reviewed.      Assessment:     1. Cough, unspecified type    2. Post-nasal drip        Plan:   Patient is very well-appearing, alert and active, VSS, afebrile without recent antipyretic, and appears well-hydrated.  Physical exam as documented above, no clinical evidence of respiratory failure, dehydration, or focal/systemic bacterial infection at this time.  Anticipatory guidance regarding viral URI's discussed with patient.  Patient declined Covid testing.  Low suspicion of bacterial sinusitis at this time based on history and physical exam.  Discussed use of over-the-counter medications, such as Sudafed and Mucinez-D, for symptoms as well as supportive care and return precautions. Patient verbalizes understanding and agrees to follow-up with PCP as needed.     Cough, unspecified type    Post-nasal drip    Other orders  -     promethazine-dextromethorphan (PROMETHAZINE-DM) 6.25-15 mg/5 mL Syrp; Take 5 mLs by mouth every 4 (four) hours as needed (cough).  Dispense: 118 mL; Refill: 0      Patient Instructions   Your illness is likely caused by a virus and antibiotics would not be beneficial at this time.    Please drink plenty of fluids and get plenty of rest.    Take over the counter Sudafed tp help with congestion and post-nasal drip symptoms.     May gargle salt water for sore throat, a tablespoon of honey is helpful for cough, especially at night.     If not allergic, please take over the counter Tylenol (Acetaminophen) and/or Motrin (Ibuprofen) as directed for control of pain and/or fever.    Please follow up with your primary care doctor or specialist as needed.    If you  smoke, please stop smoking.    - You must understand that you have received an Urgent Care treatment only and that you may be released before all of your medical problems are known or treated.   - You, the patient, will arrange for follow up care as instructed.   - If your condition worsens or fails to improve we recommend that you receive another evaluation at the ER immediately or contact your PCP to discuss your concerns or return here.   - Follow up with your PCP or specialty clinic as directed in the next 1-2 weeks if not improved or as needed.  You can call [phone] to schedule an appointment with the appropriate provider.      If your symptoms do not improve or worsen, go to the  emergency room immediately.

## 2025-02-20 ENCOUNTER — OFFICE VISIT (OUTPATIENT)
Dept: GASTROENTEROLOGY | Facility: CLINIC | Age: 60
End: 2025-02-20
Payer: COMMERCIAL

## 2025-02-20 ENCOUNTER — TELEPHONE (OUTPATIENT)
Dept: ENDOSCOPY | Facility: HOSPITAL | Age: 60
End: 2025-02-20
Payer: COMMERCIAL

## 2025-02-20 VITALS
DIASTOLIC BLOOD PRESSURE: 89 MMHG | BODY MASS INDEX: 26.23 KG/M2 | HEIGHT: 67 IN | WEIGHT: 167.13 LBS | SYSTOLIC BLOOD PRESSURE: 192 MMHG | HEART RATE: 79 BPM

## 2025-02-20 VITALS — BODY MASS INDEX: 10.52 KG/M2 | WEIGHT: 67 LBS | HEIGHT: 67 IN

## 2025-02-20 DIAGNOSIS — R19.7 DIARRHEA, UNSPECIFIED TYPE: Primary | ICD-10-CM

## 2025-02-20 DIAGNOSIS — K21.9 GASTROESOPHAGEAL REFLUX DISEASE, UNSPECIFIED WHETHER ESOPHAGITIS PRESENT: ICD-10-CM

## 2025-02-20 DIAGNOSIS — R19.7 DIARRHEA, UNSPECIFIED TYPE: ICD-10-CM

## 2025-02-20 DIAGNOSIS — R13.10 DYSPHAGIA, UNSPECIFIED TYPE: Primary | ICD-10-CM

## 2025-02-20 DIAGNOSIS — R10.9 ABDOMINAL CRAMPING: ICD-10-CM

## 2025-02-20 DIAGNOSIS — I10 HYPERTENSION, UNSPECIFIED TYPE: ICD-10-CM

## 2025-02-20 DIAGNOSIS — Z12.11 ENCOUNTER FOR SCREENING COLONOSCOPY: Primary | ICD-10-CM

## 2025-02-20 RX ORDER — DICYCLOMINE HYDROCHLORIDE 20 MG/1
20 TABLET ORAL EVERY 6 HOURS PRN
Qty: 120 TABLET | Refills: 3 | Status: SHIPPED | OUTPATIENT
Start: 2025-02-20 | End: 2025-02-20 | Stop reason: SDUPTHER

## 2025-02-20 RX ORDER — MELOXICAM 7.5 MG/1
7.5 TABLET ORAL DAILY
COMMUNITY

## 2025-02-20 RX ORDER — DICYCLOMINE HYDROCHLORIDE 20 MG/1
20 TABLET ORAL EVERY 6 HOURS PRN
Qty: 120 TABLET | Refills: 3 | Status: SHIPPED | OUTPATIENT
Start: 2025-02-20

## 2025-02-20 NOTE — PROGRESS NOTES
"[facility]    CC: Diarrhea    HPI 59 y.o. male with past medical history of chronic neck and back pain who presents with recurrent, daily, painless diarrhea which occurs 6-7 times per day with associated weight loss and abdominal cramping.  He has seen intermittent blood in his stool.    He had symptoms like this 8-10 years ago.  He remembers being treated for H.  Pylori during this time and taking medications for a year and then his symptoms improved until now.    He previously had GERD, but no longer has this.    He has intermittent dysphagia, especially to cold liquids.  He has had his esophagus dilated in the past during a previous endoscopy.    He has never had a colonoscopy.    His son has Crohn's disease.    Past Medical History  Past Medical History:   Diagnosis Date    Traumatic osteoarthritis of knee or lower leg 8/1/2012       Physical Examination  BP (!) 192/89   Pulse 79   Ht 5' 7" (1.702 m)   Wt 75.8 kg (167 lb 1.7 oz)   BMI 26.17 kg/m²   General appearance: alert, cooperative, no distress  HENT: Normocephalic, atraumatic, neck symmetrical, no nasal discharge   Abdomen: soft, non-tender; non distended, no rebound or guarding  Neurologic: Alert and oriented X 3, moving all four extremities, intact sensation to light touch    Labs:  Lab Results   Component Value Date    WBC 7.64 06/25/2020    HGB 15.3 06/25/2020    HCT 46.1 06/25/2020     (H) 06/25/2020     06/25/2020           Assessment:   The patient is a 60 yo man who presents with chronic diarrhea, abdominal cramping, and weight loss as well as dysphagia.  He has had his esophagus dilated previously.    Plan:  -Check stool studies.  -Schedule EGD and colonoscopy.  -Bentyl prescribed to take as needed for abdominal cramping.  -He will try imodium as needed for his diarrhea.  -His blood pressure was elevated at 192/89 in clinic today.  He reports his blood pressure is always normal.  He is asymptomatic.  I have " placed a referral to internal medicine for HTN.    Jessica Madera MD

## 2025-02-20 NOTE — TELEPHONE ENCOUNTER
"----- Message from Jessica Madera MD sent at 2/20/2025  4:54 PM CST -----  Regarding: EGD and colonoscopy  Procedure: EGD/ColonoscopyDiagnosis: Diarrhea, Dysphagia, and Weight lossProcedure Timing: Within 4 weeks (Urgent)#If within 4 weeks selected, please eva as high priority##If greater than 12 weeks, please select "5-12 weeks" and delay sending until 3 months prior to requested date# [facility]Additional Scheduling Information: No scheduling concernsPrep Specifications:Standard prepIs the patient taking a GLP-1 Agonist:noHave you attached a patient to this message: yes  "

## 2025-02-21 ENCOUNTER — LAB VISIT (OUTPATIENT)
Dept: LAB | Facility: HOSPITAL | Age: 60
End: 2025-02-21
Attending: INTERNAL MEDICINE
Payer: COMMERCIAL

## 2025-02-21 DIAGNOSIS — R19.7 DIARRHEA, UNSPECIFIED TYPE: ICD-10-CM

## 2025-02-21 LAB
C DIFF GDH STL QL: NEGATIVE
C DIFF TOX A+B STL QL IA: NEGATIVE

## 2025-02-21 PROCEDURE — 83993 ASSAY FOR CALPROTECTIN FECAL: CPT | Performed by: INTERNAL MEDICINE

## 2025-02-21 PROCEDURE — 87427 SHIGA-LIKE TOXIN AG IA: CPT | Mod: 59 | Performed by: INTERNAL MEDICINE

## 2025-02-21 PROCEDURE — 87324 CLOSTRIDIUM AG IA: CPT | Performed by: INTERNAL MEDICINE

## 2025-02-21 PROCEDURE — 87449 NOS EACH ORGANISM AG IA: CPT | Mod: 91 | Performed by: INTERNAL MEDICINE

## 2025-02-21 PROCEDURE — 87328 CRYPTOSPORIDIUM AG IA: CPT | Performed by: INTERNAL MEDICINE

## 2025-02-21 PROCEDURE — 87046 STOOL CULTR AEROBIC BACT EA: CPT | Performed by: INTERNAL MEDICINE

## 2025-02-21 PROCEDURE — 87045 FECES CULTURE AEROBIC BACT: CPT | Performed by: INTERNAL MEDICINE

## 2025-02-21 PROCEDURE — 87507 IADNA-DNA/RNA PROBE TQ 12-25: CPT | Performed by: INTERNAL MEDICINE

## 2025-02-21 NOTE — TELEPHONE ENCOUNTER
Referral for procedure from PAT appointment      Spoke to patient to schedule procedure(s) Colonoscopy/EGD       Physician to perform procedure(s) Dr. GENESIS Madera  Date of Procedure (s) 03/03/2025  Arrival Time 9:15 Am   Time of Procedure(s) 10:15 Am AM   Location of Procedure(s) [address]   Type of Rx Prep sent to patient: PEG  Instructions provided to patient via [facility]    Patient was informed on the following information and verbalized understanding. Screening questionnaire reviewed with patient and complete. If procedure requires anesthesia, a responsible adult needs to be present to accompany the patient home, patient cannot drive after receiving anesthesia. Appointment details are tentative, especially check-in time. Patient will receive a prep-op call 7 days prior to confirm check-in time for procedure. If applicable the patient should contact their pharmacy to verify Rx for procedure prep is ready for pick-up. Patient was advised to call the scheduling department at [phone] if pharmacy states no Rx is available. Patient was advised to call the endoscopy scheduling department if any questions or concerns arise.      [facility] Scheduling Department

## 2025-02-23 LAB
CRYPTOSP AG STL QL IA: NEGATIVE
E COLI SXT1 STL QL IA: NEGATIVE
E COLI SXT2 STL QL IA: NEGATIVE
G LAMBLIA AG STL QL IA: NEGATIVE

## 2025-02-24 ENCOUNTER — RESULTS FOLLOW-UP (OUTPATIENT)
Dept: GASTROENTEROLOGY | Facility: CLINIC | Age: 60
End: 2025-02-24

## 2025-02-24 LAB
ASTROVIRUS: NOT DETECTED
BACTERIA STL CULT: NORMAL
C COLI+JEJ+UPSA DNA STL QL NAA+NON-PROBE: NOT DETECTED
CALPROTECTIN INTERPRETATION (OHS): ABNORMAL
CALPROTECTIN: 198
CYCLOSPORA CAYETANENSIS: NOT DETECTED
ELASTASE 1, FECAL: 703
ELASTASE INTERPRETATION: NORMAL
ENTEROAGGREGATIVE E COLI: NOT DETECTED
ENTEROPATHOGENIC E COLI: NOT DETECTED
GPP - ADENOVIRUS 40/41: NOT DETECTED
GPP - CRYPTOSPORIDIUM: NOT DETECTED
GPP - ENTAMOEBA HISTOLYTICA: NOT DETECTED
GPP - ENTEROTOXIGENIC E COLI (ETEC): NOT DETECTED
GPP - GIARDIA LAMBLIA: NOT DETECTED
GPP - NOROVIRUS GI/GII: NOT DETECTED
GPP - ROTAVIRUS A: NOT DETECTED
GPP - SALMONELLA: NOT DETECTED
GPP - VIBRIO CHOLERA: NOT DETECTED
GPP - YERSINIA ENTEROCOLITICA: NOT DETECTED
LACTATE PLASV-SCNC: NOT DETECTED MMOL/L
PLESIOMONAS SHIGELLOIDES: NOT DETECTED
SAPOVIRUS: NOT DETECTED
SHIGELLA SP+EIEC IPAH STL QL NAA+PROBE: NOT DETECTED
VIBRIO: NOT DETECTED

## 2025-02-26 ENCOUNTER — OFFICE VISIT (OUTPATIENT)
Dept: CARDIOLOGY | Facility: CLINIC | Age: 60
End: 2025-02-26
Payer: COMMERCIAL

## 2025-02-26 VITALS
WEIGHT: 166.25 LBS | OXYGEN SATURATION: 97 % | DIASTOLIC BLOOD PRESSURE: 86 MMHG | HEART RATE: 91 BPM | HEIGHT: 67 IN | SYSTOLIC BLOOD PRESSURE: 158 MMHG | RESPIRATION RATE: 18 BRPM | BODY MASS INDEX: 26.09 KG/M2

## 2025-02-26 DIAGNOSIS — E78.2 MIXED HYPERLIPIDEMIA: ICD-10-CM

## 2025-02-26 DIAGNOSIS — Z72.0 TOBACCO ABUSE: ICD-10-CM

## 2025-02-26 DIAGNOSIS — I10 PRIMARY HYPERTENSION: Primary | ICD-10-CM

## 2025-02-26 DIAGNOSIS — R07.2 PRECORDIAL PAIN: ICD-10-CM

## 2025-02-26 DIAGNOSIS — Z01.810 PREOP CARDIOVASCULAR EXAM: ICD-10-CM

## 2025-02-26 DIAGNOSIS — I70.0 AORTIC ATHEROSCLEROSIS: ICD-10-CM

## 2025-02-26 LAB
OHS QRS DURATION: 94 MS
OHS QTC CALCULATION: 419 MS

## 2025-02-26 PROCEDURE — 99999 PR PBB SHADOW E&M-EST. PATIENT-LVL IV: CPT | Mod: PBBFAC,,, | Performed by: INTERNAL MEDICINE

## 2025-02-26 PROCEDURE — 93000 ELECTROCARDIOGRAM COMPLETE: CPT | Mod: S$GLB,,, | Performed by: INTERNAL MEDICINE

## 2025-02-26 RX ORDER — HYDROCHLOROTHIAZIDE 25 MG/1
25 TABLET ORAL DAILY
Qty: 90 TABLET | Refills: 3 | Status: SHIPPED | OUTPATIENT
Start: 2025-02-26

## 2025-02-26 NOTE — PROGRESS NOTES
CARDIOVASCULAR PROGRESS NOTE    REASON FOR CONSULT:   Jose M Pascual is a 59 y.o. male who presents for follow up of HTN.    PCP: Yenny Madera  HISTORY OF PRESENT ILLNESS:   Last seen by Dr. Soni March 2023, now shifting to my service.    The patient is a pleasant 59-year-old man coming in today for management of hypertension.  He was recently seen by GI for blood in the stool, as well as H pylori.  Upper and lower endoscopy is planned.  He was noted to be severely hypertensive.  He denies any overt angina, but does describe occasional chest discomfort both with and without exertion.  There was no associated dyspnea or diaphoresis.  Denies any PND, orthopnea, or lower extremity edema.  There has been no palpitations or syncope.  He otherwise denies idris melena, hematuria, or claudication symptoms.      Family history is notable for the absence premature CAD amongst first-degree relatives.      The patient is a current smoker, as well as uses smokeless tobacco.  I have strongly encouraged him to quit both of these activities.  He is amenable to enrollment in the ambulatory smoking cessation program.  He denies alcohol excess or illicit drug use.  He works as a .    CARDIOVASCULAR HISTORY:   none    PAST MEDICAL HISTORY:     Past Medical History:   Diagnosis Date    Traumatic osteoarthritis of knee or lower leg 8/1/2012       PAST SURGICAL HISTORY:     Past Surgical History:   Procedure Laterality Date    KNEE ARTHROSCOPY Right 2010    KNEE SURGERY         ALLERGIES AND MEDICATION:     Review of patient's allergies indicates:   Allergen Reactions    Tramadol Other (See Comments)     Pt states it made him feel like he was having a heart attack        Medication List            Accurate as of February 26, 2025  8:45 AM. If you have any questions, ask your nurse or doctor.                CONTINUE taking these medications      dicyclomine 20 mg tablet  Commonly known as: BENTYL  Take 1 tablet (20 mg total) by  mouth every 6 (six) hours as needed (abdominal cramping).     HYDROcodone-acetaminophen 7.5-325 mg per tablet  Commonly known as: NORCO            STOP taking these medications      celecoxib 200 MG capsule  Commonly known as: CeleBREX  Stopped by: Jose M Red MD     diazePAM 5 MG tablet  Commonly known as: VALIUM  Stopped by: Jose M Red MD     diclofenac 50 MG EC tablet  Commonly known as: VOLTAREN  Stopped by: Jose M Red MD     fluticasone propionate 50 mcg/actuation nasal spray  Commonly known as: FLONASE  Stopped by: Jose M Red MD     loratadine 10 mg tablet  Commonly known as: CLARITIN  Stopped by: Jose M Red MD     meloxicam 7.5 MG tablet  Commonly known as: MOBIC  Stopped by: Jose M Red MD     MELOXICAM ORAL  Stopped by: Jose M Red MD     mupirocin 2 % ointment  Commonly known as: BACTROBAN  Stopped by: Jose M Red MD              SOCIAL HISTORY:   Social History[1]    FAMILY HISTORY:     Family History   Problem Relation Name Age of Onset    Cancer Father age 71         throat/       REVIEW OF SYSTEMS:   Review of Systems   Constitutional:  Negative for chills, diaphoresis and fever.   HENT:  Negative for nosebleeds.    Eyes:  Negative for blurred vision, double vision and photophobia.   Respiratory:  Negative for hemoptysis, shortness of breath and wheezing.    Cardiovascular:  Positive for chest pain. Negative for palpitations, orthopnea, claudication, leg swelling and PND.   Gastrointestinal:  Negative for abdominal pain, blood in stool, heartburn, melena, nausea and vomiting.   Genitourinary:  Negative for flank pain and hematuria.   Musculoskeletal:  Negative for falls, myalgias and neck pain.   Skin:  Negative for rash.   Neurological:  Negative for dizziness, seizures, loss of consciousness, weakness and headaches.   Endo/Heme/Allergies:  Negative for polydipsia. Does not bruise/bleed easily.   Psychiatric/Behavioral:  Negative for  "depression and memory loss. The patient is not nervous/anxious.        PHYSICAL EXAM:     Vitals:    02/26/25 0829   BP: (!) 158/86   Pulse: 91   Resp: 18    Body mass index is 26.03 kg/m².  Weight: 75.4 kg (166 lb 3.6 oz)   Height: 5' 7" (170.2 cm)     Physical Exam  Vitals reviewed.   Constitutional:       General: He is not in acute distress.     Appearance: Normal appearance. He is well-developed. He is not ill-appearing, toxic-appearing or diaphoretic.   HENT:      Head: Normocephalic and atraumatic.   Eyes:      General: No scleral icterus.     Extraocular Movements: Extraocular movements intact.      Conjunctiva/sclera: Conjunctivae normal.      Pupils: Pupils are equal, round, and reactive to light.   Neck:      Thyroid: No thyromegaly.      Vascular: Normal carotid pulses. No carotid bruit or JVD.      Trachea: Trachea normal.   Cardiovascular:      Rate and Rhythm: Normal rate and regular rhythm.      Pulses:           Carotid pulses are 2+ on the right side and 2+ on the left side.     Heart sounds: S1 normal and S2 normal. No murmur heard.     No friction rub. No gallop.   Pulmonary:      Effort: Pulmonary effort is normal. No respiratory distress.      Breath sounds: Normal breath sounds. No stridor. No wheezing, rhonchi or rales.   Chest:      Chest wall: No tenderness.   Abdominal:      General: There is no distension.      Palpations: Abdomen is soft.   Musculoskeletal:         General: No swelling or tenderness. Normal range of motion.      Cervical back: Normal range of motion and neck supple. No edema or rigidity.      Right lower leg: No edema.      Left lower leg: No edema.   Feet:      Right foot:      Skin integrity: No ulcer.      Left foot:      Skin integrity: No ulcer.   Skin:     General: Skin is warm and dry.      Coloration: Skin is not jaundiced.   Neurological:      General: No focal deficit present.      Mental Status: He is alert and oriented to person, place, and time.      Cranial " "Nerves: No cranial nerve deficit.   Psychiatric:         Mood and Affect: Mood normal.         Speech: Speech normal.         Behavior: Behavior normal. Behavior is cooperative.         DATA:   EKG: (personally reviewed tracing)  2/26/25 SR 70    Laboratory:  CBC:        CHEMISTRIES:        Invalid input(s): "ESTGFRNONAFRICA"    CARDIAC BIOMARKERS:        COAGS:        LIPIDS/LFTS:        Cardiovascular Testing:  Ordered  ETT  Echo    ASSESSMENT:   # HTN, uncontrolled  # CP, atyp  # HLP (by labs 2020) not on statin, no recent labs  # tob abuse (smoking and smokeless), pt amenable to ambulatory smoking cessation program.  # preop for upper and lower endoscopy.  The patient reports acceptable exercise capacity.  # aortic atherosclerosis (CT abd 12/1/16)    PLAN:   Stop NSAIDS (pt undestands they may drive up BP), use APAP in place prn.  Quit tob use, enroll in ambulatory smoking cessation program.  The patient is at low cardiac risk for the planned GI endoscopy procedures and associated anesthesia.  Cardiac testing as ordered below does not need to hold up his procedures.  ETT  Echo  Start HCTZ 25mg qd  Check labs in 1 week: CMP/Lipids/TSH/CBC  RTC 1 month (Apr 2025)    The above documents medical care services that are part of ongoing care related to this patient's serious/complex condition (Code ). (HTN/HLP)        Jsoe M Red MD, FACC         [1]   Social History  Socioeconomic History    Marital status:    Tobacco Use    Smoking status: Every Day     Types: Cigarettes    Smokeless tobacco: Current     Types: Snuff    Tobacco comments:     pt states not even a pack a day   Substance and Sexual Activity    Alcohol use: Yes     Alcohol/week: 0.0 standard drinks of alcohol    Drug use: No     Social Drivers of Health     Financial Resource Strain: Low Risk  (2/25/2025)    Overall Financial Resource Strain (CARDIA)     Difficulty of Paying Living Expenses: Not hard at all   Food Insecurity: No Food " Insecurity (2/25/2025)    Hunger Vital Sign     Worried About Running Out of Food in the Last Year: Never true     Ran Out of Food in the Last Year: Never true   Transportation Needs: No Transportation Needs (2/25/2025)    PRAPARE - Transportation     Lack of Transportation (Medical): No     Lack of Transportation (Non-Medical): No   Physical Activity: Unknown (2/25/2025)    Exercise Vital Sign     Days of Exercise per Week: 5 days   Stress: No Stress Concern Present (2/25/2025)    [place] [employer] - Occupational Stress Questionnaire     Feeling of Stress : Only a little   Housing Stability: Low Risk  (2/25/2025)    Housing Stability Vital Sign     Unable to Pay for Housing in the Last Year: No     Number of Times Moved in the Last Year: 0     Homeless in the Last Year: No

## 2025-02-27 ENCOUNTER — OFFICE VISIT (OUTPATIENT)
Dept: FAMILY MEDICINE | Facility: CLINIC | Age: 60
End: 2025-02-27
Payer: COMMERCIAL

## 2025-02-27 VITALS
SYSTOLIC BLOOD PRESSURE: 132 MMHG | BODY MASS INDEX: 26.12 KG/M2 | HEART RATE: 71 BPM | TEMPERATURE: 98 F | HEIGHT: 67 IN | DIASTOLIC BLOOD PRESSURE: 78 MMHG | WEIGHT: 166.44 LBS | OXYGEN SATURATION: 95 %

## 2025-02-27 DIAGNOSIS — Z11.59 NEED FOR HEPATITIS C SCREENING TEST: ICD-10-CM

## 2025-02-27 DIAGNOSIS — M48.02 SPINAL STENOSIS IN CERVICAL REGION: ICD-10-CM

## 2025-02-27 DIAGNOSIS — R19.7 DIARRHEA, UNSPECIFIED TYPE: ICD-10-CM

## 2025-02-27 DIAGNOSIS — I10 ESSENTIAL HYPERTENSION: ICD-10-CM

## 2025-02-27 DIAGNOSIS — M48.061 SPINAL STENOSIS OF LUMBAR REGION WITHOUT NEUROGENIC CLAUDICATION: ICD-10-CM

## 2025-02-27 DIAGNOSIS — I10 HYPERTENSION, UNSPECIFIED TYPE: ICD-10-CM

## 2025-02-27 DIAGNOSIS — R13.10 DYSPHAGIA, UNSPECIFIED TYPE: ICD-10-CM

## 2025-02-27 DIAGNOSIS — F17.200 SMOKER: ICD-10-CM

## 2025-02-27 DIAGNOSIS — Z87.891 PERSONAL HISTORY OF TOBACCO USE, PRESENTING HAZARDS TO HEALTH: ICD-10-CM

## 2025-02-27 DIAGNOSIS — Z11.4 ENCOUNTER FOR SCREENING FOR HIV: ICD-10-CM

## 2025-02-27 DIAGNOSIS — E78.5 DYSLIPIDEMIA: ICD-10-CM

## 2025-02-27 DIAGNOSIS — Z12.5 SCREENING FOR PROSTATE CANCER: ICD-10-CM

## 2025-02-27 DIAGNOSIS — Z00.00 NORMAL PHYSICAL EXAM: Primary | ICD-10-CM

## 2025-02-27 DIAGNOSIS — F11.20 CHRONIC NARCOTIC DEPENDENCE: ICD-10-CM

## 2025-02-27 PROCEDURE — 99999 PR PBB SHADOW E&M-EST. PATIENT-LVL V: CPT | Mod: PBBFAC,,, | Performed by: INTERNAL MEDICINE

## 2025-02-27 NOTE — ASSESSMENT & PLAN NOTE
02/27/2025 future lipid profile is scheduled.  Last lipid on profile was high.  With risk factors of age, smoker, hypertension, suspect that he would benefit from statin.  Has upcoming follow up with Cardiology after labs to discuss.

## 2025-02-27 NOTE — ASSESSMENT & PLAN NOTE
02/27/2025 saw cardiology yesterday and started on HCTZ.  Took 1st dose today.  Blood pressure today is actually pretty good.  No changes.  Future labs are scheduled    Orders:    Ambulatory referral/consult to Internal Medicine

## 2025-02-27 NOTE — ASSESSMENT & PLAN NOTE
02/27/2025 followed by outside neurosurgery.  Chronic narcotics.  He is wary of surgery as the recovery time is too long given works.  He would consider it after care home.

## 2025-02-27 NOTE — PROGRESS NOTES
This note was created by combination of typed  and M-Modal dictation.  Transcription errors may be present.   This note was also generated with the assistance of ambient listening technology. Verbal consent was obtained by the patient and accompanying visitor(s) for the recording of patient appointment to facilitate this note. I attest to having reviewed and edited the generated note for accuracy, though some syntax or spelling errors may persist. Please contact the author of this note for any clarification.    Assessment and Plan:   Assessment and Plan   Assessment & Plan  Normal physical exam  Encounter for screening for HIV  Need for hepatitis C screening test  Screening for prostate cancer  02/27/2025 has future lab appointment scheduled with Cardiology, will add on PSA and A1c  Followed by GI plan is EGD and colonoscopy upcoming  He declines all vaccinations today      Orders:    Hemoglobin A1C; Future    HIV 1/2 Ag/Ab (4th Gen); Future    Hepatitis C Antibody; Future    PSA, Screening; Future    CBC Without Differential; Future    Comprehensive Metabolic Panel; Future    Lipid Panel; Future    Hemoglobin A1C; Future    PSA, Screening; Future    Essential hypertension  Hypertension, unspecified type  02/27/2025 saw cardiology yesterday and started on HCTZ.  Took 1st dose today.  Blood pressure today is actually pretty good.  No changes.  Future labs are scheduled    Orders:    Ambulatory referral/consult to Internal Medicine    Dyslipidemia  02/27/2025 future lipid profile is scheduled.  Last lipid on profile was high.  With risk factors of age, smoker, hypertension, suspect that he would benefit from statin.  Has upcoming follow up with Cardiology after labs to discuss.         Smoker  Personal history of tobacco use, presenting hazards to health  02/27/2025 has gradually cut down over the years but was referred to smoking cessation program.  He works 12 hour days so not sure if he is going to be  available for cessation classes.  Discussed CT lung screening.  He would be agreeable.  Referral for CT lung submitted    Orders:    Ambulatory referral/consult to Smoking Cessation Program; Future    CT Chest Lung Screening Low Dose; Future    Dysphagia, unspecified type  Diarrhea, unspecified type  02/27/2025 history of H pylori by recollection.  Seen by GI and plan is EGD and colonoscopy.         Spinal stenosis of lumbar region without neurogenic claudication  Spinal stenosis in cervical region  Chronic narcotic dependence  02/27/2025 followed by outside neurosurgery.  Chronic narcotics.  He is wary of surgery as the recovery time is too long given works.  He would consider it after residential.           There are no discontinued medications.    meds sent this encounter:         Follow Up:  Plan for physical exam 1 year with labs  Future Appointments   Date Time Provider Department Center   2/27/2025  1:00 PM Santiago Ramon MD [facility] [place]   3/5/2025 12:45 PM LAB, [facility] [place]   4/23/2025  7:00 AM ECHO, [facility] EKG [facility] [place]   4/23/2025 12:30 PM NON NUCLEAR TREADMILL, [facility] WB EKG [facility] [place]   4/30/2025  9:00 AM Jose M Red MD [facility] CARDIO [place]          Subjective:   Subjective   Chief Complaint   Patient presents with    Establish Care       HPI  Jose M is a 59 y.o. male.     Social History     Socioeconomic History    Marital status:    Tobacco Use    Smoking status: Every Day     Types: Cigarettes    Smokeless tobacco: Current     Types: Snuff    Tobacco comments:     pt states not even a pack a day   Substance and Sexual Activity    Alcohol use: Yes     Alcohol/week: 0.0 standard drinks of alcohol    Drug use: No     Social Drivers of Health     Financial Resource Strain: Low Risk  (2/25/2025)    Overall Financial Resource Strain (CARDIA)     Difficulty of Paying Living Expenses: Not hard at all   Food Insecurity: No Food Insecurity (2/25/2025)     Hunger Vital Sign     Worried About Running Out of Food in the Last Year: Never true     Ran Out of Food in the Last Year: Never true   Transportation Needs: No Transportation Needs (2/25/2025)    PRAPARE - Transportation     Lack of Transportation (Medical): No     Lack of Transportation (Non-Medical): No   Physical Activity: Unknown (2/25/2025)    Exercise Vital Sign     Days of Exercise per Week: 5 days   Stress: No Stress Concern Present (2/25/2025)    [place] [employer] - Occupational Stress Questionnaire     Feeling of Stress : Only a little   Housing Stability: Low Risk  (2/25/2025)    Housing Stability Vital Sign     Unable to Pay for Housing in the Last Year: No     Number of Times Moved in the Last Year: 0     Homeless in the Last Year: No       Last appointment with this clinic was Visit date not found. Last visit with me Visit date not found   To summarize last visit and events leading up to today:  New to primary care  Lumbar spinal stenosis, cervical spinal stenosis, outside neurosurgery [facility]  HTN, cardiology  Chest pain seen by cardiology, plan was nu stress test, never done  Smoker  Diarrhea chronic; and reported history of H pylori seen by GI 2/20/25, plan is EGD and colonoscopy.  Hyperlipidemia      2/26/25 seen by cardiology, OK to proceed to scope  HTN, started on HCTZ  Referred to cessation clinic    Today's visit:    History of Present Illness    SOCIAL HISTORY:  - Smoking: Currently smokes about half a pack per day, reduced from over 1 PPD for the past 4-5 years. Previously smoked over 1 PPD for 10-15 years. Started smoking at age 16-17.  Chewing tobacco: Uses dip at work  - Occupation:  at [employer] for at least 35 years, consistently since early-mid 1990s    Marital status:     HPI:  Jose M reports a history of diarrhea that started 4-5 months ago and has worsened. He had similar GI issues about 8-10 years ago, diagnosed as H.  pylori and resolved with medication after a year of treatment. Jose M also reports dysphagia.    He has chronic back pain due to a trapped nerve and three issues in his neck, currently managed with hydrocodone 7.5 mg. Surgery has been recommended but declined due to work constraints. He plans to retire next year and consider surgery then.    He was recently started on hydrochlorothiazide for hypertension, having taken the first dose today. Previous clinic visits showed consistently elevated blood pressure readings.    He smokes about half a pack of cigarettes per day for the past 4-5 years, reduced from over 1 PPD which he had been smoking for 10-15 years prior. 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 He has been a  for about 35 years, working 12-hour shifts.    He previously took meloxicam for pain management but discontinued on the advice of Dr. Red due to its potential to raise blood pressure.    He denies any other known health conditions aside from hypertension and diarrhea.    MEDICATIONS:  - Hydrochlorothiazide, started yesterday, taken in the morning  - Hydrocodone 7.5 mg, for back pain  - Dicyclomine (Bentyl), for cramping  - Discontinued meloxicam due to potential to raise blood pressure, as advised by Dr. Nair        Patient Care Team:  Santiago Ramon MD as PCP - General (Internal Medicine)    Patient Active Problem List    Diagnosis Date Noted    Chronic pain syndrome 03/18/2022    Displacement of cervical intervertebral disc without myelopathy 04/22/2021    Spinal stenosis in cervical region 04/22/2021    Spinal stenosis of lumbar region 08/13/2020    Knee pain 08/01/2012    Traumatic osteoarthritis of knee or lower leg 08/01/2012    Iliotibial band syndrome 08/01/2012       PAST MEDICAL PROBLEMS, PAST SURGICAL HISTORY: please see relevant portions of the electronic medical record    ALLERGIES AND MEDICATIONS: updated and  "reviewed.  Medication List with Changes/Refills   Current Medications    DICYCLOMINE (BENTYL) 20 MG TABLET    Take 1 tablet (20 mg total) by mouth every 6 (six) hours as needed (abdominal cramping).    HYDROCHLOROTHIAZIDE (HYDRODIURIL) 25 MG TABLET    Take 1 tablet (25 mg total) by mouth once daily.    HYDROCODONE-ACETAMINOPHEN (NORCO) 7.5-325 MG PER TABLET    Take 1 tablet by mouth every 6 (six) hours as needed.         Objective:   Objective   Physical Exam   Vitals:    02/27/25 1305   BP: 132/78   Pulse: 71   Temp: 98 °F (36.7 °C)   TempSrc: Oral   SpO2: 95%   Weight: 75.5 kg (166 lb 7.2 oz)   Height: 5' 7" (1.702 m)    Body mass index is 26.07 kg/m².            Physical Exam  Constitutional:       Appearance: Normal appearance. He is well-developed.   HENT:      Right Ear: Tympanic membrane and external ear normal.      Left Ear: Tympanic membrane and external ear normal.      Nose: Nose normal.   Eyes:      General: No scleral icterus.     Conjunctiva/sclera: Conjunctivae normal.   Neck:      Thyroid: No thyroid mass or thyromegaly.      Trachea: Trachea normal.   Cardiovascular:      Rate and Rhythm: Normal rate and regular rhythm.      Heart sounds: Normal heart sounds, S1 normal and S2 normal. No murmur heard.  Pulmonary:      Effort: Pulmonary effort is normal.      Breath sounds: Normal breath sounds.   Abdominal:      General: There is no distension.      Palpations: Abdomen is soft. There is no hepatomegaly, splenomegaly or mass.      Tenderness: There is no abdominal tenderness.   Musculoskeletal:         General: No deformity.      Right lower leg: No edema.      Left lower leg: No edema.   Lymphadenopathy:      Cervical: No cervical adenopathy.   Skin:     General: Skin is warm and dry.      Findings: No rash (on exposed skin).      Comments: On exposed skin   Neurological:      Mental Status: He is alert and oriented to person, place, and time.      Cranial Nerves: No cranial nerve deficit.      " Sensory: No sensory deficit.      Deep Tendon Reflexes: Reflexes are normal and symmetric.   Psychiatric:         Speech: Speech normal.         Behavior: Behavior normal.         Thought Content: Thought content normal.         Judgment: Judgment normal.

## 2025-02-27 NOTE — ASSESSMENT & PLAN NOTE
02/27/2025 followed by outside neurosurgery.  Chronic narcotics.  He is wary of surgery as the recovery time is too long given works.  He would consider it after [facility].

## 2025-02-27 NOTE — ASSESSMENT & PLAN NOTE
02/27/2025 has gradually cut down over the years but was referred to smoking cessation program.  He works 12 hour days so not sure if he is going to be available for cessation classes.  Discussed CT lung screening.  He would be agreeable.  Referral for CT lung submitted    Orders:    Ambulatory referral/consult to Smoking Cessation Program; Future    CT Chest Lung Screening Low Dose; Future

## 2025-02-27 NOTE — ASSESSMENT & PLAN NOTE
02/27/2025 followed by outside neurosurgery.  Chronic narcotics.  He is wary of surgery as the recovery time is too long given works.  He would consider it after [facility].

## 2025-03-03 ENCOUNTER — ANESTHESIA EVENT (OUTPATIENT)
Dept: ENDOSCOPY | Facility: HOSPITAL | Age: 60
End: 2025-03-03
Payer: COMMERCIAL

## 2025-03-03 ENCOUNTER — ANESTHESIA (OUTPATIENT)
Dept: ENDOSCOPY | Facility: HOSPITAL | Age: 60
End: 2025-03-03
Payer: COMMERCIAL

## 2025-03-03 ENCOUNTER — HOSPITAL ENCOUNTER (OUTPATIENT)
Facility: HOSPITAL | Age: 60
Discharge: HOME | End: 2025-03-03
Attending: INTERNAL MEDICINE | Admitting: INTERNAL MEDICINE
Payer: COMMERCIAL

## 2025-03-03 VITALS
TEMPERATURE: 98 F | HEART RATE: 67 BPM | HEIGHT: 67 IN | WEIGHT: 155 LBS | DIASTOLIC BLOOD PRESSURE: 75 MMHG | SYSTOLIC BLOOD PRESSURE: 144 MMHG | RESPIRATION RATE: 17 BRPM | BODY MASS INDEX: 24.33 KG/M2 | OXYGEN SATURATION: 97 %

## 2025-03-03 DIAGNOSIS — R19.7 DIARRHEA, UNSPECIFIED TYPE: Primary | ICD-10-CM

## 2025-03-03 DIAGNOSIS — R19.7 DIARRHEA: ICD-10-CM

## 2025-03-03 PROCEDURE — 25000003 PHARM REV CODE 250: Performed by: NURSE ANESTHETIST, CERTIFIED REGISTERED

## 2025-03-03 PROCEDURE — 43239 EGD BIOPSY SINGLE/MULTIPLE: CPT | Performed by: INTERNAL MEDICINE

## 2025-03-03 PROCEDURE — 43239 EGD BIOPSY SINGLE/MULTIPLE: CPT | Mod: 51,,, | Performed by: INTERNAL MEDICINE

## 2025-03-03 PROCEDURE — 37000009 HC ANESTHESIA EA ADD 15 MINS: Performed by: INTERNAL MEDICINE

## 2025-03-03 PROCEDURE — 45380 COLONOSCOPY AND BIOPSY: CPT | Performed by: INTERNAL MEDICINE

## 2025-03-03 PROCEDURE — 27201012 HC FORCEPS, HOT/COLD, DISP: Performed by: INTERNAL MEDICINE

## 2025-03-03 PROCEDURE — 63600175 PHARM REV CODE 636 W HCPCS: Performed by: NURSE ANESTHETIST, CERTIFIED REGISTERED

## 2025-03-03 PROCEDURE — 88305 TISSUE EXAM BY PATHOLOGIST: CPT | Mod: 26,,, | Performed by: PATHOLOGY

## 2025-03-03 PROCEDURE — 88305 TISSUE EXAM BY PATHOLOGIST: CPT | Performed by: PATHOLOGY

## 2025-03-03 PROCEDURE — 37000008 HC ANESTHESIA 1ST 15 MINUTES: Performed by: INTERNAL MEDICINE

## 2025-03-03 PROCEDURE — 45380 COLONOSCOPY AND BIOPSY: CPT | Mod: ,,, | Performed by: INTERNAL MEDICINE

## 2025-03-03 RX ORDER — LIDOCAINE HYDROCHLORIDE 20 MG/ML
INJECTION INTRAVENOUS
Status: DISCONTINUED | OUTPATIENT
Start: 2025-03-03 | End: 2025-03-03

## 2025-03-03 RX ORDER — PROPOFOL 10 MG/ML
VIAL (ML) INTRAVENOUS
Status: DISCONTINUED
Start: 2025-03-03 | End: 2025-03-03 | Stop reason: HOSPADM

## 2025-03-03 RX ORDER — LIDOCAINE HYDROCHLORIDE 40 MG/ML
SOLUTION TOPICAL
Status: DISCONTINUED
Start: 2025-03-03 | End: 2025-03-03 | Stop reason: HOSPADM

## 2025-03-03 RX ORDER — LIDOCAINE HYDROCHLORIDE 20 MG/ML
INJECTION, SOLUTION EPIDURAL; INFILTRATION; INTRACAUDAL; PERINEURAL
Status: DISCONTINUED
Start: 2025-03-03 | End: 2025-03-03 | Stop reason: HOSPADM

## 2025-03-03 RX ORDER — PROPOFOL 10 MG/ML
VIAL (ML) INTRAVENOUS
Status: DISCONTINUED | OUTPATIENT
Start: 2025-03-03 | End: 2025-03-03

## 2025-03-03 RX ORDER — LIDOCAINE HYDROCHLORIDE 40 MG/ML
SOLUTION TOPICAL
Status: DISCONTINUED | OUTPATIENT
Start: 2025-03-03 | End: 2025-03-03

## 2025-03-03 RX ADMIN — PROPOFOL 40 MG: 10 INJECTION, EMULSION INTRAVENOUS at 11:03

## 2025-03-03 RX ADMIN — LIDOCAINE HYDROCHLORIDE 4 ML: 40 SOLUTION TOPICAL at 11:03

## 2025-03-03 RX ADMIN — PROPOFOL 100 MG: 10 INJECTION, EMULSION INTRAVENOUS at 11:03

## 2025-03-03 RX ADMIN — SODIUM CHLORIDE: 0.9 INJECTION, SOLUTION INTRAVENOUS at 11:03

## 2025-03-03 RX ADMIN — LIDOCAINE HYDROCHLORIDE 100 MG: 20 INJECTION, SOLUTION INTRAVENOUS at 11:03

## 2025-03-03 NOTE — ANESTHESIA PREPROCEDURE EVALUATION
03/03/2025  Jose M Pascual is a 59 y.o., male.    Pre-operative evaluation for Procedure(s) (LRB):  EGD (ESOPHAGOGASTRODUODENOSCOPY) (N/A)  COLONOSCOPY (N/A)    Jose M Pascual is a 59 y.o. male     Patient Active Problem List   Diagnosis    Knee pain    Traumatic osteoarthritis of knee or lower leg    Iliotibial band syndrome    Displacement of cervical intervertebral disc without myelopathy    Chronic pain syndrome    Spinal stenosis in cervical region    Spinal stenosis of lumbar region    Chronic narcotic dependence    Dyslipidemia    Smoker    Essential hypertension       Review of patient's allergies indicates:   Allergen Reactions    Tramadol Other (See Comments)     Pt states it made him feel like he was having a heart attack       Medications Ordered Prior to Encounter[1]    Past Surgical History:   Procedure Laterality Date    KNEE ARTHROSCOPY Right 01/01/2010    KNEE SURGERY      TONSILLECTOMY  1980 or 81       Social History[2]        Pre-op Assessment          Review of Systems  Anesthesia Hx:  No problems with previous Anesthesia                Social:  Smoker       Hematology/Oncology:  Hematology Normal                                     EENT/Dental:  EENT/Dental Normal           Cardiovascular:     Hypertension                                          Pulmonary:  Pulmonary Normal                       Hepatic/GI:  Bowel Prep.                   Musculoskeletal:  Arthritis               Neurological:  Neurology Normal                                      Endocrine:  Endocrine Normal                Physical Exam  General: Well nourished, Cooperative, Oriented and Alert    Airway:  Mouth Opening: Normal  TM Distance: Normal  Tongue: Normal  Neck ROM: Normal ROM    Dental:  Intact    Chest/Lungs:  Normal Respiratory Rate        Anesthesia Plan  Type of Anesthesia, risks & benefits  discussed:    Anesthesia Type: Gen Natural Airway  Intra-op Monitoring Plan: Standard ASA Monitors  Induction:  IV  Informed Consent: Informed consent signed with the Patient and all parties understand the risks and agree with anesthesia plan.  All questions answered.   ASA Score: 2    Ready For Surgery From Anesthesia Perspective.     .           [1]   No current facility-administered medications on file prior to encounter.     Current Outpatient Medications on File Prior to Encounter   Medication Sig Dispense Refill    dicyclomine (BENTYL) 20 mg tablet Take 1 tablet (20 mg total) by mouth every 6 (six) hours as needed (abdominal cramping). 120 tablet 3    HYDROcodone-acetaminophen (NORCO) 7.5-325 mg per tablet Take 1 tablet by mouth every 6 (six) hours as needed.     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[place]   Tobacco Use    Smoking status: Every Day     Current packs/day: 0.50     Average packs/day: 0.9 packs/day for 30.2 years (27.6 ttl pk-yrs)     Types: Cigarettes, Cigars     Start date: 1995    Smokeless tobacco: Current     Types: Snuff    Tobacco comments:     pt states not even a pack a day   Substance and Sexual Activity    Alcohol use: Yes     Alcohol/week: 1.0 standard drink of alcohol     Types: 1 Shots of liquor per week     Comment: Maybe once or twice a month or at holidays    Drug use: No    Sexual activity: Yes     Partners: Female     Birth control/protection: None     Social Drivers of Health     Financial Resource Strain: Low Risk  (2/25/2025)    Overall Financial Resource Strain (CARDIA)     Difficulty of Paying Living Expenses: Not hard at all   Food Insecurity: No Food Insecurity (2/25/2025)    Hunger Vital Sign     Worried About Running Out of Food in the Last Year: Never true     Ran Out of Food in the Last Year: Never true   Transportation Needs: No Transportation Needs (2/25/2025)    PRAPARE -  Transportation     Lack of Transportation (Medical): No     Lack of Transportation (Non-Medical): No   Physical Activity: Unknown (2/25/2025)    Exercise Vital Sign     Days of Exercise per Week: 5 days   Stress: No Stress Concern Present (2/25/2025)    [employer] - Occupational Stress Questionnaire     Feeling of Stress : Only a little   Housing Stability: Low Risk  (2/25/2025)    Housing Stability Vital Sign     Unable to Pay for Housing in the Last Year: No     Number of Times Moved in the Last Year: 0     Homeless in the Last Year: No

## 2025-03-03 NOTE — PLAN OF CARE
Pt alert and awake ,wife at bedside her and pt vb understanding of all instructions,denies discomfort at this time. Pt desires to go home. spoke with pt at bedside

## 2025-03-03 NOTE — TRANSFER OF CARE
"Anesthesia Transfer of Care Note    Patient: Jose M Pascual    Procedure(s) Performed: Procedure(s) (LRB):  EGD (ESOPHAGOGASTRODUODENOSCOPY) (N/A)  COLONOSCOPY (N/A)    Patient location: GI    Anesthesia Type: general    Transport from OR: Transported from OR on room air with adequate spontaneous ventilation    Post pain: adequate analgesia    Post assessment: tolerated procedure well and no apparent anesthetic complications    Post vital signs: stable    Level of consciousness: sedated    Nausea/Vomiting: no nausea/vomiting    Complications: none    Transfer of care protocol was followed      Last vitals: Visit Vitals  /81 (BP Location: Left arm, Patient Position: Lying)   Pulse 73   Temp 36.4 °C (97.5 °F) (Oral)   Resp 17   Ht 5' 7" (1.702 m)   Wt 70.3 kg (155 lb)   SpO2 95%   BMI 24.28 kg/m²     "

## 2025-03-03 NOTE — H&P
Short Stay Endoscopy History and Physical    PCP - Santiago Ramon MD    Procedure - EGD/Colonoscopy  ASA - per anesthesia  Mallampati - per anesthesia  History of Anesthesia problems - no  Family history Anesthesia problems -  no   Plan of anesthesia - MAC    HPI:  This is a 59 y.o. male here for evaluation of :     EGD - Dysphagia, diarrhea  Colon - diarrhea    ROS:  Constitutional: No fevers, chills, No weight loss  CV: No chest pain  Pulm: No cough, No shortness of breath  Ophtho: No vision changes  GI: see HPI  Derm: No rash    Medical History:  has a past medical history of Dyslipidemia (2/27/2025), Essential hypertension (2/27/2025), and Traumatic osteoarthritis of knee or lower leg (8/1/2012).    Surgical History:  has a past surgical history that includes Knee surgery; Knee arthroscopy (Right, 01/01/2010); and Tonsillectomy (1980 or 81).    Family History: family history includes Asthma in his daughter; Cancer in his father.. Otherwise no colon cancer, inflammatory bowel disease, or GI malignancies.    Social History:  reports that he has been smoking cigarettes and cigars. He started smoking about 30 years ago. He has a 27.6 pack-year smoking history. His smokeless tobacco use includes snuff. He reports current alcohol use of about 1.0 standard drink of alcohol per week. He reports that he does not use drugs.    Review of patient's allergies indicates:   Allergen Reactions    Tramadol Other (See Comments)     Pt states it made him feel like he was having a heart attack       Medications:   Prescriptions Prior to Admission[1]    Physical Exam:    Vital Signs: There were no vitals filed for this visit.    Gen: NAD, lying comfortably  HENT: NCAT, oropharynx clear  Eyes: anicteric sclerae, EOMI grossly  Neck: supple, no visible masses/goiter  Cardiac: RRR  Lungs: non-labored breathing  Abd: soft, NT/ND, normoactive BS  Ext: no LE edema, warm, well perfused  Skin: skin intact on exposed body parts, no visible  rashes, lesions  Neuro: A&Ox4, neuro exam grossly intact, moves all extremities  Psych: appropriate mood, affect      Labs:  Lab Results   Component Value Date    WBC 7.64 06/25/2020    HGB 15.3 06/25/2020    HCT 46.1 06/25/2020     06/25/2020    CHOL 237 (H) 06/09/2010    TRIG 127 06/09/2010    HDL 39 (L) 06/09/2010    ALT 51 (H) 06/25/2020    AST 33 06/25/2020     06/25/2020    K 4.4 06/25/2020     06/25/2020    CREATININE 1.2 06/25/2020    BUN 9 06/25/2020    CO2 30 (H) 06/25/2020    PSA 0.61 04/19/2010    INR 1.0 03/02/2011    HGBA1C 5.6 03/02/2011       Plan:  EGD for dysphagia, diarrhea  Colonoscopy for diarrhea    I have explained the risks and benefits of endoscopy procedures to the patient including but not limited to bleeding, perforation, infection, and death.  The patient was asked if they understand and allowed to ask any further questions to their satisfaction.    Jessica Madera MD         [1]   Medications Prior to Admission   Medication Sig Dispense Refill Last Dose/Taking    dicyclomine (BENTYL) 20 mg tablet Take 1 tablet (20 mg total) by mouth every 6 (six) hours as needed (abdominal cramping). 120 tablet 3     hydroCHLOROthiazide (HYDRODIURIL) 25 MG tablet Take 1 tablet (25 mg total) by mouth once daily. 90 tablet 3     HYDROcodone-acetaminophen (NORCO) 7.5-325 mg per tablet Take 1 tablet by mouth every 6 (six) hours as needed.

## 2025-03-03 NOTE — PROVATION PATIENT INSTRUCTIONS
Discharge Summary/Instructions after an Endoscopic Procedure  Patient Name: Jose M Pascual  Patient MRN: [id number]  Patient YOB: 1965  Monday, March 3, 2025  Jessica Madera MD  Dear patient,  As a result of recent federal legislation (The Federal Cures Act), you may   receive lab or pathology results from your procedure in your [facility]   account before your physician is able to contact you. Your physician or   their representative will relay the results to you with their   recommendations at their soonest availability.  Thank you,  RESTRICTIONS:  During your procedure today, you received medications for sedation.  These   medications may affect your judgment, balance and coordination.  Therefore,   for 24 hours, you have the following restrictions:   - DO NOT drive a car, operate machinery, make legal/financial decisions,   sign important papers or drink alcohol.    ACTIVITY:  Today: no heavy lifting, straining or running due to procedural   sedation/anesthesia.  The following day: return to full activity including work.  DIET:  Eat and drink normally unless instructed otherwise.     TREATMENT FOR COMMON SIDE EFFECTS:  - Mild abdominal pain, nausea, belching, bloating or excessive gas:  rest,   eat lightly and use a heating pad.  - Sore Throat: treat with throat lozenges and/or gargle with warm salt   water.  - Because air was used during the procedure, expelling large amounts of air   from your rectum or belching is normal.  - If a bowel prep was taken, you may not have a bowel movement for 1-3 days.    This is normal.  SYMPTOMS TO WATCH FOR AND REPORT TO YOUR PHYSICIAN:  1. Abdominal pain or bloating, other than gas cramps.  2. Chest pain.  3. Back pain.  4. Signs of infection such as: chills or fever occurring within 24 hours   after the procedure.  5. Rectal bleeding, which would show as bright red, maroon, or black stools.   (A tablespoon of blood from the rectum is not serious, especially if    hemorrhoids are present.)  6. Vomiting.  7. Weakness or dizziness.  GO DIRECTLY TO THE NEAREST EMERGENCY ROOM IF YOU HAVE ANY OF THE FOLLOWING:      Difficulty breathing              Chills and/or fever over 101 F   Persistent vomiting and/or vomiting blood   Severe abdominal pain   Severe chest pain   Black, tarry stools   Bleeding- more than one tablespoon   Any other symptom or condition that you feel may need urgent attention  Your doctor recommends these additional instructions:  If any biopsies were taken, your doctors clinic will contact you in 1 to 2   weeks with any results.  - Proceed to colonoscopy.  - Resume previous diet.   - Continue present medications.   - Await pathology results.  For questions, problems or results please call your physician - Jessica Madera MD at Work:  [phone].  [facility] can be reached at [phone]     IF A COMPLICATION OR EMERGENCY SITUATION ARISES AND YOU ARE UNABLE TO REACH   YOUR PHYSICIAN - GO DIRECTLY TO THE EMERGENCY ROOM.  Jessica Madera MD  3/3/2025 11:21:40 AM  This report has been verified and signed electronically.  Dear patient,  As a result of recent federal legislation (The Federal Cures Act), you may   receive lab or pathology results from your procedure in your [facility]   account before your physician is able to contact you. Your physician or   their representative will relay the results to you with their   recommendations at their soonest availability.  Thank you,  PROVATION

## 2025-03-03 NOTE — PROVATION PATIENT INSTRUCTIONS
Discharge Summary/Instructions after an Endoscopic Procedure  Patient Name: Jose M Pascual  Patient MRN: [id number]  Patient YOB: 1965  Monday, March 3, 2025  Jessica Madera MD  Dear patient,  As a result of recent federal legislation (The Federal Cures Act), you may   receive lab or pathology results from your procedure in your [facility]   account before your physician is able to contact you. Your physician or   their representative will relay the results to you with their   recommendations at their soonest availability.  Thank you,  RESTRICTIONS:  During your procedure today, you received medications for sedation.  These   medications may affect your judgment, balance and coordination.  Therefore,   for 24 hours, you have the following restrictions:   - DO NOT drive a car, operate machinery, make legal/financial decisions,   sign important papers or drink alcohol.    ACTIVITY:  Today: no heavy lifting, straining or running due to procedural   sedation/anesthesia.  The following day: return to full activity including work.  DIET:  Eat and drink normally unless instructed otherwise.     TREATMENT FOR COMMON SIDE EFFECTS:  - Mild abdominal pain, nausea, belching, bloating or excessive gas:  rest,   eat lightly and use a heating pad.  - Sore Throat: treat with throat lozenges and/or gargle with warm salt   water.  - Because air was used during the procedure, expelling large amounts of air   from your rectum or belching is normal.  - If a bowel prep was taken, you may not have a bowel movement for 1-3 days.    This is normal.  SYMPTOMS TO WATCH FOR AND REPORT TO YOUR PHYSICIAN:  1. Abdominal pain or bloating, other than gas cramps.  2. Chest pain.  3. Back pain.  4. Signs of infection such as: chills or fever occurring within 24 hours   after the procedure.  5. Rectal bleeding, which would show as bright red, maroon, or black stools.   (A tablespoon of blood from the rectum is not serious, especially if    hemorrhoids are present.)  6. Vomiting.  7. Weakness or dizziness.  GO DIRECTLY TO THE NEAREST EMERGENCY ROOM IF YOU HAVE ANY OF THE FOLLOWING:      Difficulty breathing              Chills and/or fever over 101 F   Persistent vomiting and/or vomiting blood   Severe abdominal pain   Severe chest pain   Black, tarry stools   Bleeding- more than one tablespoon   Any other symptom or condition that you feel may need urgent attention  Your doctor recommends these additional instructions:  If any biopsies were taken, your doctors clinic will contact you in 1 to 2   weeks with any results.  - Patient has a contact number available for emergencies.  The signs and   symptoms of potential delayed complications were discussed with the   patient.  Return to normal activities tomorrow.  Written discharge   instructions were provided to the patient.   - Resume previous diet.   - Discharge patient to home (with escort).   - Repeat colonoscopy in 10 years for screening purposes.   - Await pathology results.   - Perform a computed tomographic (CT scan) enterography at appointment to be   scheduled.  For questions, problems or results please call your physician - Jessica Madera MD at Work:  [phone].  [facility] can be reached at [phone]     IF A COMPLICATION OR EMERGENCY SITUATION ARISES AND YOU ARE UNABLE TO REACH   YOUR PHYSICIAN - GO DIRECTLY TO THE EMERGENCY ROOM.  Jessica Madera MD  3/3/2025 11:48:01 AM  This report has been verified and signed electronically.  Dear patient,  As a result of recent federal legislation (The Federal Cures Act), you may   receive lab or pathology results from your procedure in [facility]   account before your physician is able to contact you. Your physician or   their representative will relay the results to you with their   recommendations at their soonest availability.  Thank you,  PROVATION

## 2025-03-05 ENCOUNTER — LAB VISIT (OUTPATIENT)
Dept: LAB | Facility: HOSPITAL | Age: 60
End: 2025-03-05
Attending: INTERNAL MEDICINE
Payer: COMMERCIAL

## 2025-03-05 ENCOUNTER — RESULTS FOLLOW-UP (OUTPATIENT)
Dept: CARDIOLOGY | Facility: HOSPITAL | Age: 60
End: 2025-03-05

## 2025-03-05 ENCOUNTER — TELEPHONE (OUTPATIENT)
Dept: GASTROENTEROLOGY | Facility: CLINIC | Age: 60
End: 2025-03-05
Payer: COMMERCIAL

## 2025-03-05 ENCOUNTER — RESULTS FOLLOW-UP (OUTPATIENT)
Dept: GASTROENTEROLOGY | Facility: CLINIC | Age: 60
End: 2025-03-05

## 2025-03-05 DIAGNOSIS — Z11.4 ENCOUNTER FOR SCREENING FOR HIV: ICD-10-CM

## 2025-03-05 DIAGNOSIS — Z00.00 NORMAL PHYSICAL EXAM: ICD-10-CM

## 2025-03-05 DIAGNOSIS — I10 PRIMARY HYPERTENSION: ICD-10-CM

## 2025-03-05 DIAGNOSIS — E78.2 MIXED HYPERLIPIDEMIA: ICD-10-CM

## 2025-03-05 DIAGNOSIS — R07.2 PRECORDIAL PAIN: ICD-10-CM

## 2025-03-05 DIAGNOSIS — Z11.59 NEED FOR HEPATITIS C SCREENING TEST: ICD-10-CM

## 2025-03-05 LAB
ALBUMIN SERPL BCP-MCNC: 4.2 G/DL (ref 3.5–5.2)
ALP SERPL-CCNC: 113 U/L (ref 40–150)
ALT SERPL W/O P-5'-P-CCNC: 62 U/L (ref 10–44)
ANION GAP SERPL CALC-SCNC: 11 MMOL/L (ref 8–16)
AST SERPL-CCNC: 53 U/L (ref 10–40)
BASOPHILS # BLD AUTO: 0.05 K/UL (ref 0–0.2)
BASOPHILS NFR BLD: 0.6 % (ref 0–1.9)
BILIRUB SERPL-MCNC: 0.7 MG/DL (ref 0.1–1)
BUN SERPL-MCNC: 14 MG/DL (ref 6–20)
CALCIUM SERPL-MCNC: 10.2 MG/DL (ref 8.7–10.5)
CHLORIDE SERPL-SCNC: 101 MMOL/L (ref 95–110)
CHOLEST SERPL-MCNC: 178 MG/DL (ref 120–199)
CHOLEST/HDLC SERPL: 4.5 {RATIO} (ref 2–5)
CO2 SERPL-SCNC: 30 MMOL/L (ref 23–29)
COMPLEXED PSA SERPL-MCNC: 0.37 NG/ML (ref 0–4)
CREAT SERPL-MCNC: 0.9 MG/DL (ref 0.5–1.4)
DIFFERENTIAL METHOD BLD: ABNORMAL
EOSINOPHIL # BLD AUTO: 0.3 K/UL (ref 0–0.5)
EOSINOPHIL NFR BLD: 3.2 % (ref 0–8)
ERYTHROCYTE [DISTWIDTH] IN BLOOD BY AUTOMATED COUNT: 13 % (ref 11.5–14.5)
EST. GFR  (NO RACE VARIABLE): >60 ML/MIN/1.73 M^2
ESTIMATED AVG GLUCOSE: 105 MG/DL (ref 68–131)
FINAL PATHOLOGIC DIAGNOSIS: NORMAL
GLUCOSE SERPL-MCNC: 99 MG/DL (ref 70–110)
GROSS: NORMAL
HBA1C MFR BLD: 5.3 % (ref 4–5.6)
HCT VFR BLD AUTO: 46.5 % (ref 40–54)
HCV AB SERPL QL IA: NORMAL
HDLC SERPL-MCNC: 40 MG/DL (ref 40–75)
HDLC SERPL: 22.5 % (ref 20–50)
HGB BLD-MCNC: 15.7 G/DL (ref 14–18)
HIV 1+2 AB+HIV1 P24 AG SERPL QL IA: NORMAL
IMM GRANULOCYTES # BLD AUTO: 0.02 K/UL (ref 0–0.04)
IMM GRANULOCYTES NFR BLD AUTO: 0.3 % (ref 0–0.5)
LDLC SERPL CALC-MCNC: 116.4 MG/DL (ref 63–159)
LYMPHOCYTES # BLD AUTO: 1.7 K/UL (ref 1–4.8)
LYMPHOCYTES NFR BLD: 21.1 % (ref 18–48)
Lab: NORMAL
MCH RBC QN AUTO: 32 PG (ref 27–31)
MCHC RBC AUTO-ENTMCNC: 33.8 G/DL (ref 32–36)
MCV RBC AUTO: 95 FL (ref 82–98)
MONOCYTES # BLD AUTO: 0.7 K/UL (ref 0.3–1)
MONOCYTES NFR BLD: 8.4 % (ref 4–15)
NEUTROPHILS # BLD AUTO: 5.3 K/UL (ref 1.8–7.7)
NEUTROPHILS NFR BLD: 66.4 % (ref 38–73)
NONHDLC SERPL-MCNC: 138 MG/DL
NRBC BLD-RTO: 0 /100 WBC
PLATELET # BLD AUTO: 316 K/UL (ref 150–450)
PMV BLD AUTO: 10.2 FL (ref 9.2–12.9)
POTASSIUM SERPL-SCNC: 3.4 MMOL/L (ref 3.5–5.1)
PROT SERPL-MCNC: 7.3 G/DL (ref 6–8.4)
RBC # BLD AUTO: 4.91 M/UL (ref 4.6–6.2)
SODIUM SERPL-SCNC: 142 MMOL/L (ref 136–145)
T4 FREE SERPL-MCNC: 1.36 NG/DL (ref 0.71–1.51)
TRIGL SERPL-MCNC: 108 MG/DL (ref 30–150)
TSH SERPL DL<=0.005 MIU/L-ACNC: <0.01 UIU/ML (ref 0.4–4)
WBC # BLD AUTO: 7.9 K/UL (ref 3.9–12.7)

## 2025-03-05 PROCEDURE — 85025 COMPLETE CBC W/AUTO DIFF WBC: CPT | Performed by: INTERNAL MEDICINE

## 2025-03-05 PROCEDURE — 83036 HEMOGLOBIN GLYCOSYLATED A1C: CPT | Performed by: INTERNAL MEDICINE

## 2025-03-05 PROCEDURE — 84443 ASSAY THYROID STIM HORMONE: CPT | Performed by: INTERNAL MEDICINE

## 2025-03-05 PROCEDURE — 80053 COMPREHEN METABOLIC PANEL: CPT | Performed by: INTERNAL MEDICINE

## 2025-03-05 PROCEDURE — 86803 HEPATITIS C AB TEST: CPT | Performed by: INTERNAL MEDICINE

## 2025-03-05 PROCEDURE — 87389 HIV-1 AG W/HIV-1&-2 AB AG IA: CPT | Performed by: INTERNAL MEDICINE

## 2025-03-05 PROCEDURE — 84153 ASSAY OF PSA TOTAL: CPT | Performed by: INTERNAL MEDICINE

## 2025-03-05 PROCEDURE — 36415 COLL VENOUS BLD VENIPUNCTURE: CPT | Mod: PO | Performed by: INTERNAL MEDICINE

## 2025-03-05 PROCEDURE — 84439 ASSAY OF FREE THYROXINE: CPT | Performed by: INTERNAL MEDICINE

## 2025-03-05 PROCEDURE — 80061 LIPID PANEL: CPT | Performed by: INTERNAL MEDICINE

## 2025-03-05 NOTE — TELEPHONE ENCOUNTER
MA attempted to contact patient to schedule his CT enterography and lab per Dr. Madera, but patient did not answer. A voicemail was left for patient to return a call to our office.    Her/She

## 2025-03-05 NOTE — TELEPHONE ENCOUNTER
----- Message from Jessica Madera MD sent at 3/3/2025 11:34 AM CST -----  Regarding: CT enterography and creatinine  Can you call the patient later today to schedule him for a creatinine and CT enterography?  Thanks!Jessica

## 2025-03-07 ENCOUNTER — RESULTS FOLLOW-UP (OUTPATIENT)
Dept: FAMILY MEDICINE | Facility: CLINIC | Age: 60
End: 2025-03-07
Payer: COMMERCIAL

## 2025-03-07 ENCOUNTER — HOSPITAL ENCOUNTER (OUTPATIENT)
Dept: RADIOLOGY | Facility: HOSPITAL | Age: 60
Discharge: HOME | End: 2025-03-07
Attending: INTERNAL MEDICINE
Payer: COMMERCIAL

## 2025-03-07 DIAGNOSIS — R19.7 DIARRHEA, UNSPECIFIED TYPE: ICD-10-CM

## 2025-03-07 DIAGNOSIS — Z12.11 SCREENING FOR COLON CANCER: ICD-10-CM

## 2025-03-07 DIAGNOSIS — R74.01 TRANSAMINITIS: ICD-10-CM

## 2025-03-07 DIAGNOSIS — R94.6 ABNORMAL THYROID FUNCTION TEST: Primary | ICD-10-CM

## 2025-03-07 DIAGNOSIS — E05.90 HYPERTHYROIDISM: ICD-10-CM

## 2025-03-07 PROCEDURE — A9698 NON-RAD CONTRAST MATERIALNOC: HCPCS | Performed by: INTERNAL MEDICINE

## 2025-03-07 PROCEDURE — 74177 CT ABD & PELVIS W/CONTRAST: CPT | Mod: TC

## 2025-03-07 PROCEDURE — 25500020 PHARM REV CODE 255: Performed by: INTERNAL MEDICINE

## 2025-03-07 PROCEDURE — 74177 CT ABD & PELVIS W/CONTRAST: CPT | Mod: 26,,, | Performed by: RADIOLOGY

## 2025-03-07 RX ADMIN — BARIUM SULFATE 450 ML: 1 SUSPENSION ORAL at 04:03

## 2025-03-07 RX ADMIN — IOHEXOL 75 ML: 350 INJECTION, SOLUTION INTRAVENOUS at 04:03

## 2025-03-07 NOTE — PROGRESS NOTES
Screening PSA normal   Screening HIV, hep C negative  A1c normal  TSH undetectable free T4 normal   CBC normal   CMP mild hypokalemia mild transaminitis    Spoke with the patient.  Discussed the findings of TSH.  He has been having GI issues and is undergoing workup.  Discussed that that is possible that if thyroid truly is dysfunctional that is could be a contributor.    Would repeat labs.  Has upcoming follow up with Cardiology 4/30 and will schedule for labs that day.  Repeat LFTs for transaminitis  Check thyroid antibodies and TSH T3

## 2025-03-10 NOTE — ANESTHESIA POSTPROCEDURE EVALUATION
Anesthesia Post Evaluation    Patient: Jose M Pascual    Procedure(s) Performed: Procedure(s) (LRB):  EGD (ESOPHAGOGASTRODUODENOSCOPY) (N/A)  COLONOSCOPY (N/A)    Final Anesthesia Type: general      Patient location during evaluation: GI PACU  Patient participation: Yes- Able to Participate  Level of consciousness: awake and alert and oriented  Post-procedure vital signs: reviewed and stable  Pain management: adequate  Airway patency: patent    PONV status at discharge: No PONV  Anesthetic complications: no      Cardiovascular status: blood pressure returned to baseline and hemodynamically stable  Respiratory status: unassisted, spontaneous ventilation and room air  Hydration status: euvolemic  Follow-up not needed.              Vitals Value Taken Time   /75 03/03/25 12:10   Temp 36.4 °C (97.5 °F) 03/03/25 11:53   Pulse 67 03/03/25 12:10   Resp 17 03/03/25 11:53   SpO2 97 % 03/03/25 12:10         Event Time   Out of Recovery 12:34:03         Pain/Sharyn Score: No data recorded

## 2025-03-18 PROBLEM — R19.7 DIARRHEA: Status: ACTIVE | Noted: 2025-03-18

## 2025-03-18 PROBLEM — E05.90 HYPERTHYROIDISM: Status: ACTIVE | Noted: 2025-03-18

## 2025-03-18 PROBLEM — Z12.11 SCREENING FOR COLON CANCER: Status: ACTIVE | Noted: 2025-03-18

## 2025-03-18 RX ORDER — METHIMAZOLE 5 MG/1
5 TABLET ORAL DAILY
Qty: 30 TABLET | Refills: 2 | Status: SHIPPED | OUTPATIENT
Start: 2025-03-18

## 2025-03-18 NOTE — TELEPHONE ENCOUNTER
EGD and colonoscopy, biopsies negative.  H pylori negative.  03/07/2025 CT enterography negative:  No CT findings to suggest small bowel Crohn's disease.  Mild hepatomegaly with 1 cm hepatic cyst within the right lobe of the liver.  4 mm nonobstructing calculus within the upper pole the right kidney.  Tiny fat containing umbilical hernia.

## 2025-05-21 DIAGNOSIS — R10.9 ABDOMINAL CRAMPING: ICD-10-CM

## 2025-05-21 RX ORDER — DICYCLOMINE HYDROCHLORIDE 20 MG/1
20 TABLET ORAL EVERY 6 HOURS PRN
Qty: 360 TABLET | Refills: 1 | Status: SHIPPED | OUTPATIENT
Start: 2025-05-21

## 2025-06-12 DIAGNOSIS — E05.90 HYPERTHYROIDISM: ICD-10-CM

## 2025-06-12 RX ORDER — METHIMAZOLE 5 MG/1
5 TABLET ORAL
Qty: 90 TABLET | OUTPATIENT
Start: 2025-06-12

## 2025-06-12 NOTE — TELEPHONE ENCOUNTER
No care due was identified.  [facility] Embedded Care Due Messages. Reference number: [id number].   6/12/2025 12:31:25 AM CDT

## 2025-06-16 ENCOUNTER — TELEPHONE (OUTPATIENT)
Dept: FAMILY MEDICINE | Facility: CLINIC | Age: 60
End: 2025-06-16
Payer: COMMERCIAL

## 2025-06-16 ENCOUNTER — LAB VISIT (OUTPATIENT)
Dept: LAB | Facility: HOSPITAL | Age: 60
End: 2025-06-16
Attending: INTERNAL MEDICINE
Payer: COMMERCIAL

## 2025-06-16 DIAGNOSIS — R74.01 TRANSAMINITIS: ICD-10-CM

## 2025-06-16 DIAGNOSIS — E05.90 HYPERTHYROIDISM: ICD-10-CM

## 2025-06-16 DIAGNOSIS — R94.6 ABNORMAL THYROID FUNCTION TEST: ICD-10-CM

## 2025-06-16 LAB
ALBUMIN SERPL BCP-MCNC: 4.7 G/DL (ref 3.5–5.2)
ALP SERPL-CCNC: 107 UNIT/L (ref 40–150)
ALT SERPL W/O P-5'-P-CCNC: 35 UNIT/L (ref 10–44)
ANION GAP (OHS): 13 MMOL/L (ref 8–16)
AST SERPL-CCNC: 28 UNIT/L (ref 11–45)
BILIRUB SERPL-MCNC: 0.5 MG/DL (ref 0.1–1)
BUN SERPL-MCNC: 12 MG/DL (ref 8–23)
CALCIUM SERPL-MCNC: 9.5 MG/DL (ref 8.7–10.5)
CHLORIDE SERPL-SCNC: 103 MMOL/L (ref 95–110)
CO2 SERPL-SCNC: 24 MMOL/L (ref 23–29)
CREAT SERPL-MCNC: 1.2 MG/DL (ref 0.5–1.4)
GFR SERPLBLD CREATININE-BSD FMLA CKD-EPI: >60 ML/MIN/1.73/M2
GLUCOSE SERPL-MCNC: 125 MG/DL (ref 70–110)
POTASSIUM SERPL-SCNC: 3.6 MMOL/L (ref 3.5–5.1)
PROT SERPL-MCNC: 7.4 GM/DL (ref 6–8.4)
SODIUM SERPL-SCNC: 140 MMOL/L (ref 136–145)
T3FREE SERPL-MCNC: 92 NG/DL (ref 60–180)
T4 FREE SERPL-MCNC: 0.72 NG/DL (ref 0.71–1.51)
THYROPEROXIDASE IGG SERPL-ACNC: <6 IU/ML
TSH SERPL-ACNC: 12.22 UIU/ML (ref 0.4–4)

## 2025-06-16 PROCEDURE — 36415 COLL VENOUS BLD VENIPUNCTURE: CPT | Mod: PO

## 2025-06-16 PROCEDURE — 83520 IMMUNOASSAY QUANT NOS NONAB: CPT

## 2025-06-16 PROCEDURE — 84439 ASSAY OF FREE THYROXINE: CPT

## 2025-06-16 PROCEDURE — 84443 ASSAY THYROID STIM HORMONE: CPT

## 2025-06-16 PROCEDURE — 84075 ASSAY ALKALINE PHOSPHATASE: CPT

## 2025-06-16 PROCEDURE — 86376 MICROSOMAL ANTIBODY EACH: CPT

## 2025-06-16 PROCEDURE — 84480 ASSAY TRIIODOTHYRONINE (T3): CPT

## 2025-06-16 RX ORDER — METHIMAZOLE 5 MG/1
5 TABLET ORAL DAILY
Qty: 30 TABLET | Refills: 0 | Status: SHIPPED | OUTPATIENT
Start: 2025-06-16 | End: 2025-06-17 | Stop reason: SDUPTHER

## 2025-06-16 NOTE — TELEPHONE ENCOUNTER
Copied from CRM #[id number]. Topic: Medications - Medication Question  >> Jun 16, 2025  8:03 AM Med Assistant Celia wrote:  Type: Patient Call Back    Who called: Patient    What is the request in detail: Pt is requesting a call back to see why he is taken of thyroid medication. Please assist.    Can the clinic reply by [facility]? no    Would the patient rather a call back or a response via [facility]? call    Best call back number:[phone] (H)

## 2025-06-16 NOTE — TELEPHONE ENCOUNTER
Needs repeat labs to make sure the methimazole is at appropriate dose.  He missed his lab appointment in April.  Have him set up lab visit TSH, T3, thyrotropin receptor antibody and thyroid peroxidase antibody

## 2025-06-16 NOTE — TELEPHONE ENCOUNTER
No care due was identified.  [facility] Embedded Care Due Messages. Reference number: [id number].   6/16/2025 9:14:57 AM CDT

## 2025-06-17 ENCOUNTER — RESULTS FOLLOW-UP (OUTPATIENT)
Dept: FAMILY MEDICINE | Facility: CLINIC | Age: 60
End: 2025-06-17
Payer: COMMERCIAL

## 2025-06-17 ENCOUNTER — PATIENT MESSAGE (OUTPATIENT)
Dept: FAMILY MEDICINE | Facility: CLINIC | Age: 60
End: 2025-06-17
Payer: COMMERCIAL

## 2025-06-17 DIAGNOSIS — E05.90 HYPERTHYROIDISM: ICD-10-CM

## 2025-06-17 RX ORDER — METHIMAZOLE 5 MG/1
5 TABLET ORAL
Qty: 30 TABLET | Refills: 0 | Status: SHIPPED | OUTPATIENT
Start: 2025-06-18

## 2025-06-17 NOTE — PROGRESS NOTES
TSH now high on tapazole 5 daily  Previously low prior to initiation  FT4 WNL  T3 WNL  TPO Ab negative  CMP mild hyperglycemia otherwise WNL previous transaminitis    TRAb pending  Results to pt.  Change to 3 times a week  Refer to endo

## 2025-06-18 PROBLEM — E05.00 GRAVES DISEASE: Status: ACTIVE | Noted: 2025-03-18

## 2025-06-18 LAB — TSH RECEP AB SER-ACNC: 37 IU/L (ref 0–1.75)

## 2025-06-18 NOTE — PROGRESS NOTES
TSH now high on tapazole 5 daily  Previously low prior to initiation  FT4 WNL  T3 WNL  TPO Ab negative  CMP mild hyperglycemia otherwise WNL previous transaminitis  TRAb positive    Results to pt.  Change to 3 times a week  Refer to endo

## 2025-08-22 ENCOUNTER — HOSPITAL ENCOUNTER (EMERGENCY)
Facility: HOSPITAL | Age: 60
Discharge: HOME | End: 2025-08-22
Attending: STUDENT IN AN ORGANIZED HEALTH CARE EDUCATION/TRAINING PROGRAM
Payer: COMMERCIAL

## 2025-08-22 VITALS
WEIGHT: 170 LBS | HEIGHT: 67 IN | BODY MASS INDEX: 26.68 KG/M2 | RESPIRATION RATE: 19 BRPM | DIASTOLIC BLOOD PRESSURE: 70 MMHG | SYSTOLIC BLOOD PRESSURE: 150 MMHG | HEART RATE: 71 BPM | TEMPERATURE: 98 F | OXYGEN SATURATION: 99 %

## 2025-08-22 DIAGNOSIS — S05.01XA ABRASION OF RIGHT CORNEA, INITIAL ENCOUNTER: Primary | ICD-10-CM

## 2025-08-22 PROCEDURE — 25000003 PHARM REV CODE 250: Mod: ER | Performed by: STUDENT IN AN ORGANIZED HEALTH CARE EDUCATION/TRAINING PROGRAM

## 2025-08-22 PROCEDURE — 99283 EMERGENCY DEPT VISIT LOW MDM: CPT | Mod: ER

## 2025-08-22 RX ORDER — ERYTHROMYCIN 5 MG/G
OINTMENT OPHTHALMIC
Qty: 1 G | Refills: 0 | Status: SHIPPED | OUTPATIENT
Start: 2025-08-22

## 2025-08-22 RX ORDER — TETRACAINE HYDROCHLORIDE 5 MG/ML
2 SOLUTION OPHTHALMIC
Status: COMPLETED | OUTPATIENT
Start: 2025-08-22 | End: 2025-08-22

## 2025-08-22 RX ORDER — FLUORESCEIN SODIUM 1 MG/MG
1 STRIP OPHTHALMIC
Status: COMPLETED | OUTPATIENT
Start: 2025-08-22 | End: 2025-08-22

## 2025-08-22 RX ADMIN — TETRACAINE HYDROCHLORIDE 2 DROP: 5 SOLUTION OPHTHALMIC at 07:08

## 2025-08-22 RX ADMIN — FLUORESCEIN SODIUM 1 EACH: 1 STRIP OPHTHALMIC at 07:08
